# Patient Record
Sex: MALE | Race: WHITE | NOT HISPANIC OR LATINO | Employment: FULL TIME | URBAN - NONMETROPOLITAN AREA
[De-identification: names, ages, dates, MRNs, and addresses within clinical notes are randomized per-mention and may not be internally consistent; named-entity substitution may affect disease eponyms.]

---

## 2023-08-15 PROBLEM — N40.0 BPH (BENIGN PROSTATIC HYPERPLASIA): Status: ACTIVE | Noted: 2023-08-15

## 2023-08-15 PROBLEM — G45.9 TIA (TRANSIENT ISCHEMIC ATTACK): Status: ACTIVE | Noted: 2023-08-15

## 2023-08-15 PROBLEM — I10 BENIGN ESSENTIAL HTN: Status: ACTIVE | Noted: 2023-08-15

## 2023-08-15 PROBLEM — E78.00 HYPERCHOLESTEREMIA: Status: ACTIVE | Noted: 2023-08-15

## 2023-08-15 PROBLEM — K21.9 GERD (GASTROESOPHAGEAL REFLUX DISEASE): Status: ACTIVE | Noted: 2023-08-15

## 2023-08-15 PROBLEM — F41.9 ANXIETY: Status: ACTIVE | Noted: 2023-08-15

## 2023-08-15 RX ORDER — METOPROLOL TARTRATE 25 MG/1
1 TABLET, FILM COATED ORAL 2 TIMES DAILY
COMMUNITY
Start: 2016-07-09 | End: 2023-10-30

## 2023-08-15 RX ORDER — DOCUSATE SODIUM 100 MG/1
CAPSULE, LIQUID FILLED ORAL 2 TIMES DAILY
COMMUNITY
Start: 2021-05-27 | End: 2023-08-28 | Stop reason: ALTCHOICE

## 2023-08-28 ENCOUNTER — OFFICE VISIT (OUTPATIENT)
Dept: PRIMARY CARE | Facility: CLINIC | Age: 63
End: 2023-08-28
Payer: MEDICARE

## 2023-08-28 VITALS
WEIGHT: 153 LBS | HEART RATE: 86 BPM | DIASTOLIC BLOOD PRESSURE: 76 MMHG | BODY MASS INDEX: 22.59 KG/M2 | SYSTOLIC BLOOD PRESSURE: 124 MMHG | TEMPERATURE: 97.4 F | OXYGEN SATURATION: 98 %

## 2023-08-28 DIAGNOSIS — Z79.899 HIGH RISK MEDICATION USE: ICD-10-CM

## 2023-08-28 DIAGNOSIS — F32.A DEPRESSION, UNSPECIFIED DEPRESSION TYPE: Primary | ICD-10-CM

## 2023-08-28 DIAGNOSIS — K21.9 GASTROESOPHAGEAL REFLUX DISEASE WITHOUT ESOPHAGITIS: ICD-10-CM

## 2023-08-28 DIAGNOSIS — I10 BENIGN ESSENTIAL HTN: ICD-10-CM

## 2023-08-28 DIAGNOSIS — I10 HYPERTENSION, UNSPECIFIED TYPE: ICD-10-CM

## 2023-08-28 DIAGNOSIS — E78.00 HYPERCHOLESTEREMIA: ICD-10-CM

## 2023-08-28 DIAGNOSIS — E55.9 VITAMIN D DEFICIENCY, UNSPECIFIED: ICD-10-CM

## 2023-08-28 DIAGNOSIS — R53.83 OTHER FATIGUE: ICD-10-CM

## 2023-08-28 DIAGNOSIS — Z12.5 SCREENING FOR PROSTATE CANCER: ICD-10-CM

## 2023-08-28 DIAGNOSIS — E53.8 VITAMIN B12 DEFICIENCY: ICD-10-CM

## 2023-08-28 PROCEDURE — 99214 OFFICE O/P EST MOD 30 MIN: CPT | Performed by: INTERNAL MEDICINE

## 2023-08-28 PROCEDURE — 1159F MED LIST DOCD IN RCRD: CPT | Performed by: INTERNAL MEDICINE

## 2023-08-28 PROCEDURE — 1160F RVW MEDS BY RX/DR IN RCRD: CPT | Performed by: INTERNAL MEDICINE

## 2023-08-28 PROCEDURE — 3078F DIAST BP <80 MM HG: CPT | Performed by: INTERNAL MEDICINE

## 2023-08-28 PROCEDURE — 3074F SYST BP LT 130 MM HG: CPT | Performed by: INTERNAL MEDICINE

## 2023-08-28 RX ORDER — BUPROPION HYDROCHLORIDE 150 MG/1
150 TABLET ORAL EVERY MORNING
Qty: 30 TABLET | Refills: 1 | Status: SHIPPED | OUTPATIENT
Start: 2023-08-28 | End: 2023-09-20

## 2023-08-28 ASSESSMENT — ENCOUNTER SYMPTOMS
DEPRESSION: 1
FATIGUE: 1
DIFFICULTY URINATING: 0
ARTHRALGIAS: 0
SINUS PAIN: 0
COUGH: 0
ABDOMINAL PAIN: 0
SORE THROAT: 0
WHEEZING: 0
DIARRHEA: 0
DIZZINESS: 0
BRUISES/BLEEDS EASILY: 0
FEVER: 0
BLOOD IN STOOL: 0
HEADACHES: 0
PALPITATIONS: 0
HYPERTENSION: 1
UNEXPECTED WEIGHT CHANGE: 0

## 2023-08-28 NOTE — PROGRESS NOTES
Subjective   Patient ID: Bao Toth is a 63 y.o. male who presents for Anxiety, Benign Prostatic Hypertrophy, Hypertension, GERD, Hyperlipidemia, Transient Ischemic Attack, Fatigue, and Depression (Son was killed a few months ago).    -Fatigue and tiredness depression no energy symptoms worsened after his son was killed in May car accident  Patient counseled on sleep hygiene counseled about alcohol abstinence  Started Wellbutrin 150 daily titrate slowly as needed  -Nicotine dependency counseled about smoking cessation  Counseled about alcohol abstinence comes about sleep hygiene comes about smoking cessation  Suspected sleep apnea refer patient to sleep clinic  -Hypertension controlled continue with metoprolol no palpitations  -Vitamin D deficiency need to start vitamin D 5000 daily over-the-counter  -Benign prostatic hypertrophy symptoms are controlled  -Chronic reflux disease pantoprazole  Follow-up  3months  Needs to complete blood work before next appointment      Anxiety  Patient reports no chest pain, dizziness or palpitations.       Benign Prostatic Hypertrophy    Hypertension  Associated symptoms include anxiety. Pertinent negatives include no chest pain, headaches or palpitations.   GERD  He reports no abdominal pain, no chest pain, no coughing, no sore throat or no wheezing. Associated symptoms include fatigue.   Hyperlipidemia  Pertinent negatives include no chest pain.   Transient Ischemic Attack  Associated symptoms include fatigue. Pertinent negatives include no abdominal pain, arthralgias, chest pain, congestion, coughing, fever, headaches, rash or sore throat.   Fatigue  Associated symptoms include fatigue. Pertinent negatives include no abdominal pain, arthralgias, chest pain, congestion, coughing, fever, headaches, rash or sore throat.   DepressionPatient is not experiencing: palpitations.             Review of Systems   Constitutional:  Positive for fatigue. Negative for fever and  "unexpected weight change.   HENT:  Negative for congestion, ear discharge, ear pain, mouth sores, sinus pain and sore throat.    Eyes:  Negative for visual disturbance.   Respiratory:  Negative for cough and wheezing.    Cardiovascular:  Negative for chest pain, palpitations and leg swelling.   Gastrointestinal:  Negative for abdominal pain, blood in stool and diarrhea.   Genitourinary:  Negative for difficulty urinating.   Musculoskeletal:  Negative for arthralgias.   Skin:  Negative for rash.   Neurological:  Negative for dizziness and headaches.   Hematological:  Does not bruise/bleed easily.   Psychiatric/Behavioral:  Positive for depression. Negative for behavioral problems.    All other systems reviewed and are negative.      Objective   No results found for: \"HGBA1C\"   /76   Pulse 86   Temp 36.3 °C (97.4 °F)   Wt 69.4 kg (153 lb)   SpO2 98%   BMI 22.59 kg/m²     Physical Exam  Vitals and nursing note reviewed.   Constitutional:       Appearance: Normal appearance.   HENT:      Head: Normocephalic.      Nose: Nose normal.   Eyes:      Conjunctiva/sclera: Conjunctivae normal.      Pupils: Pupils are equal, round, and reactive to light.   Cardiovascular:      Rate and Rhythm: Regular rhythm.   Pulmonary:      Effort: Pulmonary effort is normal.      Breath sounds: Normal breath sounds.   Abdominal:      General: Abdomen is flat.      Palpations: Abdomen is soft.   Musculoskeletal:      Cervical back: Neck supple.   Skin:     General: Skin is warm.   Neurological:      General: No focal deficit present.      Mental Status: He is oriented to person, place, and time.      Cranial Nerves: No cranial nerve deficit.      Sensory: No sensory deficit.      Motor: No weakness.      Coordination: Coordination normal.      Gait: Gait normal.      Deep Tendon Reflexes: Reflexes normal.   Psychiatric:         Mood and Affect: Mood normal.         Behavior: Behavior normal.         Thought Content: Thought content " normal.         Judgment: Judgment normal.         Assessment/Plan   Bao was seen today for anxiety, benign prostatic hypertrophy, hypertension, gerd, hyperlipidemia, transient ischemic attack, fatigue and depression.  Diagnoses and all orders for this visit:  Depression, unspecified depression type (Primary)  -     buPROPion XL (Wellbutrin XL) 150 mg 24 hr tablet; Take 1 tablet (150 mg) by mouth once daily in the morning. Do not crush, chew, or split.  Benign essential HTN  Hypercholesteremia  -     Lipid Panel; Future  Gastroesophageal reflux disease without esophagitis  High risk medication use  -     CBC and Auto Differential; Future  Hypertension, unspecified type  -     Comprehensive Metabolic Panel; Future  Other fatigue  -     TSH with reflex to Free T4 if abnormal; Future  Screening for prostate cancer  -     Prostate Specific Antigen, Screen; Future  Vitamin B12 deficiency  -     Vitamin B12; Future  Vitamin D deficiency, unspecified  -     Vitamin D, Total; Future  Other orders  -     Follow Up In Primary Care - Established; Future   -Fatigue and tiredness depression no energy symptoms worsened after his son was killed in May car accident  Patient counseled on sleep hygiene counseled about alcohol abstinence  Started Wellbutrin 150 daily titrate slowly as needed  -Nicotine dependency counseled about smoking cessation  Counseled about alcohol abstinence comes about sleep hygiene comes about smoking cessation  Suspected sleep apnea refer patient to sleep clinic  -Hypertension controlled continue with metoprolol no palpitations  -Vitamin D deficiency need to start vitamin D 5000 daily over-the-counter  -Benign prostatic hypertrophy symptoms are controlled  -Chronic reflux disease pantoprazole  Follow-up  3months  Needs to complete blood work before next appointment

## 2023-08-31 ENCOUNTER — LAB (OUTPATIENT)
Dept: LAB | Facility: LAB | Age: 63
End: 2023-08-31
Payer: MEDICARE

## 2023-08-31 DIAGNOSIS — E78.00 HYPERCHOLESTEREMIA: ICD-10-CM

## 2023-08-31 DIAGNOSIS — I10 HYPERTENSION, UNSPECIFIED TYPE: ICD-10-CM

## 2023-08-31 DIAGNOSIS — E55.9 VITAMIN D DEFICIENCY, UNSPECIFIED: ICD-10-CM

## 2023-08-31 DIAGNOSIS — E53.8 VITAMIN B12 DEFICIENCY: ICD-10-CM

## 2023-08-31 DIAGNOSIS — R53.83 OTHER FATIGUE: ICD-10-CM

## 2023-08-31 DIAGNOSIS — Z12.5 SCREENING FOR PROSTATE CANCER: ICD-10-CM

## 2023-08-31 DIAGNOSIS — Z79.899 HIGH RISK MEDICATION USE: ICD-10-CM

## 2023-08-31 LAB
ALANINE AMINOTRANSFERASE (SGPT) (U/L) IN SER/PLAS: 17 U/L (ref 10–52)
ALBUMIN (G/DL) IN SER/PLAS: 4.3 G/DL (ref 3.4–5)
ALKALINE PHOSPHATASE (U/L) IN SER/PLAS: 47 U/L (ref 33–136)
ANION GAP IN SER/PLAS: 11 MMOL/L (ref 10–20)
ASPARTATE AMINOTRANSFERASE (SGOT) (U/L) IN SER/PLAS: 19 U/L (ref 9–39)
BASOPHILS (10*3/UL) IN BLOOD BY AUTOMATED COUNT: 0.09 X10E9/L (ref 0–0.1)
BASOPHILS/100 LEUKOCYTES IN BLOOD BY AUTOMATED COUNT: 1 % (ref 0–2)
BILIRUBIN TOTAL (MG/DL) IN SER/PLAS: 0.5 MG/DL (ref 0–1.2)
CALCIDIOL (25 OH VITAMIN D3) (NG/ML) IN SER/PLAS: 70 NG/ML
CALCIUM (MG/DL) IN SER/PLAS: 9.2 MG/DL (ref 8.6–10.3)
CARBON DIOXIDE, TOTAL (MMOL/L) IN SER/PLAS: 28 MMOL/L (ref 21–32)
CHLORIDE (MMOL/L) IN SER/PLAS: 103 MMOL/L (ref 98–107)
CHOLESTEROL (MG/DL) IN SER/PLAS: 177 MG/DL (ref 0–199)
CHOLESTEROL IN HDL (MG/DL) IN SER/PLAS: 63.6 MG/DL
CHOLESTEROL/HDL RATIO: 2.8
COBALAMIN (VITAMIN B12) (PG/ML) IN SER/PLAS: 601 PG/ML (ref 211–911)
CREATININE (MG/DL) IN SER/PLAS: 0.58 MG/DL (ref 0.5–1.3)
EOSINOPHILS (10*3/UL) IN BLOOD BY AUTOMATED COUNT: 0.39 X10E9/L (ref 0–0.7)
EOSINOPHILS/100 LEUKOCYTES IN BLOOD BY AUTOMATED COUNT: 4.5 % (ref 0–6)
ERYTHROCYTE DISTRIBUTION WIDTH (RATIO) BY AUTOMATED COUNT: 12.4 % (ref 11.5–14.5)
ERYTHROCYTE MEAN CORPUSCULAR HEMOGLOBIN CONCENTRATION (G/DL) BY AUTOMATED: 34.3 G/DL (ref 32–36)
ERYTHROCYTE MEAN CORPUSCULAR VOLUME (FL) BY AUTOMATED COUNT: 95 FL (ref 80–100)
ERYTHROCYTES (10*6/UL) IN BLOOD BY AUTOMATED COUNT: 4.85 X10E12/L (ref 4.5–5.9)
GFR MALE: >90 ML/MIN/1.73M2
GLUCOSE (MG/DL) IN SER/PLAS: 87 MG/DL (ref 74–99)
HEMATOCRIT (%) IN BLOOD BY AUTOMATED COUNT: 46 % (ref 41–52)
HEMOGLOBIN (G/DL) IN BLOOD: 15.8 G/DL (ref 13.5–17.5)
IMMATURE GRANULOCYTES/100 LEUKOCYTES IN BLOOD BY AUTOMATED COUNT: 0.3 % (ref 0–0.9)
LDL: 97 MG/DL (ref 0–99)
LEUKOCYTES (10*3/UL) IN BLOOD BY AUTOMATED COUNT: 8.6 X10E9/L (ref 4.4–11.3)
LYMPHOCYTES (10*3/UL) IN BLOOD BY AUTOMATED COUNT: 2.01 X10E9/L (ref 1.2–4.8)
LYMPHOCYTES/100 LEUKOCYTES IN BLOOD BY AUTOMATED COUNT: 23.4 % (ref 13–44)
MONOCYTES (10*3/UL) IN BLOOD BY AUTOMATED COUNT: 0.64 X10E9/L (ref 0.1–1)
MONOCYTES/100 LEUKOCYTES IN BLOOD BY AUTOMATED COUNT: 7.4 % (ref 2–10)
NEUTROPHILS (10*3/UL) IN BLOOD BY AUTOMATED COUNT: 5.44 X10E9/L (ref 1.2–7.7)
NEUTROPHILS/100 LEUKOCYTES IN BLOOD BY AUTOMATED COUNT: 63.4 % (ref 40–80)
PLATELETS (10*3/UL) IN BLOOD AUTOMATED COUNT: 195 X10E9/L (ref 150–450)
POTASSIUM (MMOL/L) IN SER/PLAS: 4.6 MMOL/L (ref 3.5–5.3)
PROSTATE SPECIFIC ANTIGEN,SCREEN: 4.5 NG/ML (ref 0–4)
PROTEIN TOTAL: 6.4 G/DL (ref 6.4–8.2)
SODIUM (MMOL/L) IN SER/PLAS: 137 MMOL/L (ref 136–145)
THYROTROPIN (MIU/L) IN SER/PLAS BY DETECTION LIMIT <= 0.05 MIU/L: 1.37 MIU/L (ref 0.44–3.98)
TRIGLYCERIDE (MG/DL) IN SER/PLAS: 83 MG/DL (ref 0–149)
UREA NITROGEN (MG/DL) IN SER/PLAS: 8 MG/DL (ref 6–23)
VLDL: 17 MG/DL (ref 0–40)

## 2023-08-31 PROCEDURE — 82607 VITAMIN B-12: CPT

## 2023-08-31 PROCEDURE — 85025 COMPLETE CBC W/AUTO DIFF WBC: CPT

## 2023-08-31 PROCEDURE — 82306 VITAMIN D 25 HYDROXY: CPT

## 2023-08-31 PROCEDURE — 80053 COMPREHEN METABOLIC PANEL: CPT

## 2023-08-31 PROCEDURE — 84443 ASSAY THYROID STIM HORMONE: CPT

## 2023-08-31 PROCEDURE — G0103 PSA SCREENING: HCPCS

## 2023-08-31 PROCEDURE — 80061 LIPID PANEL: CPT

## 2023-08-31 PROCEDURE — 36415 COLL VENOUS BLD VENIPUNCTURE: CPT

## 2023-09-20 DIAGNOSIS — F32.A DEPRESSION, UNSPECIFIED DEPRESSION TYPE: ICD-10-CM

## 2023-09-20 RX ORDER — BUPROPION HYDROCHLORIDE 150 MG/1
150 TABLET ORAL EVERY MORNING
Qty: 30 TABLET | Refills: 1 | Status: SHIPPED | OUTPATIENT
Start: 2023-09-20 | End: 2023-10-10

## 2023-10-05 DIAGNOSIS — F32.A DEPRESSION, UNSPECIFIED DEPRESSION TYPE: ICD-10-CM

## 2023-10-10 RX ORDER — BUPROPION HYDROCHLORIDE 150 MG/1
150 TABLET ORAL EVERY MORNING
Qty: 90 TABLET | Refills: 0 | Status: SHIPPED | OUTPATIENT
Start: 2023-10-10 | End: 2023-12-07 | Stop reason: ALTCHOICE

## 2023-10-30 DIAGNOSIS — I10 BENIGN ESSENTIAL HTN: ICD-10-CM

## 2023-10-30 RX ORDER — METOPROLOL TARTRATE 25 MG/1
25 TABLET, FILM COATED ORAL 2 TIMES DAILY
Qty: 180 TABLET | Refills: 0 | Status: SHIPPED | OUTPATIENT
Start: 2023-10-30 | End: 2024-01-31

## 2023-11-28 ENCOUNTER — APPOINTMENT (OUTPATIENT)
Dept: PRIMARY CARE | Facility: CLINIC | Age: 63
End: 2023-11-28
Payer: MEDICARE

## 2023-12-07 ENCOUNTER — OFFICE VISIT (OUTPATIENT)
Dept: PRIMARY CARE | Facility: CLINIC | Age: 63
End: 2023-12-07
Payer: MEDICARE

## 2023-12-07 VITALS
DIASTOLIC BLOOD PRESSURE: 70 MMHG | SYSTOLIC BLOOD PRESSURE: 120 MMHG | HEART RATE: 81 BPM | BODY MASS INDEX: 23.05 KG/M2 | OXYGEN SATURATION: 97 % | HEIGHT: 69 IN | TEMPERATURE: 96.9 F | WEIGHT: 155.6 LBS

## 2023-12-07 DIAGNOSIS — Z00.00 ROUTINE GENERAL MEDICAL EXAMINATION AT HEALTH CARE FACILITY: ICD-10-CM

## 2023-12-07 DIAGNOSIS — R97.20 HIGH PROSTATE SPECIFIC ANTIGEN (PSA): Primary | ICD-10-CM

## 2023-12-07 DIAGNOSIS — I10 BENIGN ESSENTIAL HTN: ICD-10-CM

## 2023-12-07 DIAGNOSIS — Z87.19 H/O INGUINAL HERNIA: ICD-10-CM

## 2023-12-07 DIAGNOSIS — Z11.59 NEED FOR HEPATITIS C SCREENING TEST: ICD-10-CM

## 2023-12-07 DIAGNOSIS — Z13.6 SCREENING FOR CARDIOVASCULAR CONDITION: ICD-10-CM

## 2023-12-07 PROCEDURE — 3078F DIAST BP <80 MM HG: CPT | Performed by: INTERNAL MEDICINE

## 2023-12-07 PROCEDURE — 1160F RVW MEDS BY RX/DR IN RCRD: CPT | Performed by: INTERNAL MEDICINE

## 2023-12-07 PROCEDURE — 3074F SYST BP LT 130 MM HG: CPT | Performed by: INTERNAL MEDICINE

## 2023-12-07 PROCEDURE — 1170F FXNL STATUS ASSESSED: CPT | Performed by: INTERNAL MEDICINE

## 2023-12-07 PROCEDURE — 1159F MED LIST DOCD IN RCRD: CPT | Performed by: INTERNAL MEDICINE

## 2023-12-07 PROCEDURE — G0402 INITIAL PREVENTIVE EXAM: HCPCS | Performed by: INTERNAL MEDICINE

## 2023-12-07 PROCEDURE — G0403 EKG FOR INITIAL PREVENT EXAM: HCPCS | Performed by: INTERNAL MEDICINE

## 2023-12-07 ASSESSMENT — ENCOUNTER SYMPTOMS
PALPITATIONS: 0
COUGH: 0
HYPERTENSION: 1
ARTHRALGIAS: 0
BRUISES/BLEEDS EASILY: 0
DIARRHEA: 0
FATIGUE: 0
BLOOD IN STOOL: 0
LOSS OF SENSATION IN FEET: 0
SORE THROAT: 0
OCCASIONAL FEELINGS OF UNSTEADINESS: 0
ABDOMINAL PAIN: 0
WHEEZING: 0
FEVER: 0
UNEXPECTED WEIGHT CHANGE: 0
DEPRESSION: 1
SINUS PAIN: 0
DIZZINESS: 0
DIFFICULTY URINATING: 0
HEADACHES: 0

## 2023-12-07 ASSESSMENT — ACTIVITIES OF DAILY LIVING (ADL)
MANAGING_FINANCES: INDEPENDENT
DRESSING: INDEPENDENT
DOING_HOUSEWORK: INDEPENDENT
GROCERY_SHOPPING: INDEPENDENT
TAKING_MEDICATION: INDEPENDENT
BATHING: INDEPENDENT

## 2023-12-07 ASSESSMENT — VISUAL ACUITY
OD_CC: 20/40
OS_CC: 20/20

## 2023-12-07 ASSESSMENT — PATIENT HEALTH QUESTIONNAIRE - PHQ9
1. LITTLE INTEREST OR PLEASURE IN DOING THINGS: SEVERAL DAYS
2. FEELING DOWN, DEPRESSED OR HOPELESS: SEVERAL DAYS
SUM OF ALL RESPONSES TO PHQ9 QUESTIONS 1 AND 2: 2

## 2023-12-07 NOTE — PROGRESS NOTES
"Subjective   Patient ID: Bao Toth is a 63 y.o. male who presents for Welcome To Medicare, GERD, Hypertension, Depression, Results, and no energy.    -Fatigue and tiredness depression no energy symptoms worsened after his son was killed in May car accident  Patient counseled on sleep hygiene counseled about alcohol abstinence  Started Wellbutrin 150 daily titrate slowly as needed  -Nicotine dependency counseled about smoking cessation  Counseled about alcohol abstinence comes about sleep hygiene comes about smoking cessation  Suspected sleep apnea refer patient to sleep clinic  -Hypertension controlled continue with metoprolol no palpitations  -Vitamin D deficiency need to start vitamin D 5000 daily over-the-counter  -Benign prostatic hypertrophy symptoms are controlled  -Chronic reflux disease pantoprazole  Follow-up  3months  Needs to complete blood work before next appointment         GERD    Hypertension    Depression         Review of Systems   Psychiatric/Behavioral:  Positive for depression.        Objective   No results found for: \"HGBA1C\"   /70   Pulse 81   Temp 36.1 °C (96.9 °F)   Ht 1.753 m (5' 9\")   Wt 70.6 kg (155 lb 9.6 oz)   SpO2 97%   BMI 22.98 kg/m²   Lab Results   Component Value Date    WBC 8.6 08/31/2023    HGB 15.8 08/31/2023    HCT 46.0 08/31/2023     08/31/2023    CHOL 177 08/31/2023    TRIG 83 08/31/2023    HDL 63.6 08/31/2023    ALT 17 08/31/2023    AST 19 08/31/2023     08/31/2023    K 4.6 08/31/2023     08/31/2023    CREATININE 0.58 08/31/2023    BUN 8 08/31/2023    CO2 28 08/31/2023    TSH 1.37 08/31/2023    INR 1.1 08/13/2018     par   Physical Exam    Assessment/Plan   Bao was seen today for welcome to medicare, gerd, hypertension, depression, results and no energy.  Diagnoses and all orders for this visit:  Depression, unspecified depression type  Benign essential HTN  Epigastric pain     "

## 2023-12-07 NOTE — PROGRESS NOTES
Subjective   Reason for Visit: Bao Toth is an 63 y.o. male here for a Medicare Wellness visit.     Past Medical, Surgical, and Family History reviewed and updated in chart.    Reviewed all medications by prescribing practitioner or clinical pharmacist (such as prescriptions, OTCs, herbal therapies and supplements) and documented in the medical record.    Welcome to Medicare  - Vaccinations reviewed patient need to proceed with Shingrix vaccine  - Screening colonoscopy obtained in 2016 need to repeat in 10 years 2026  -Recent blood work reviewed and up-to-date  - Screening for depression negative patient recovering now since his son passed away in a car accident doing otherwise well did not tolerate any antidepressant patient not taking anything at this time  -Nicotine dependency counseled about smoking cessation  -Counseled about alcohol abstinence comes about sleep hygiene comes about smoking cessation  - Needs screening for hepatitis C  - EKG sinus rhythm no ST T wave changes patient reassured    Follow-up  - Underlying groin hernia seen by general surgery patient need to quit smoking first patient wants to delay any surgical intervention will follow patient conservatively follow-up to the emergency room if any signs of obstruction  -Elevated blood PSA, 4.5 asymptomatic may have underlying mild BPH repeat PSA again in 6 months  - Suspected sleep apnea refer patient to sleep clinic patient declined  -Hypertension controlled continue with metoprolol no palpitations  -Vitamin D deficiency continue on vitamin D 5000 daily over-the-counter  -Benign prostatic hypertrophy symptoms are controlled  -Chronic reflux disease pantoprazole  Follow-up 6 months    GERD  He reports no abdominal pain, no chest pain, no coughing, no sore throat or no wheezing. Pertinent negatives include no fatigue.   Hypertension  Pertinent negatives include no chest pain, headaches or palpitations.   DepressionPatient is not experiencing:  "palpitations.          Patient Care Team:  Artemio Rajput MD as PCP - General  Artemio Rajput MD as PCP - [employer] PCP     Review of Systems   Constitutional:  Negative for fatigue, fever and unexpected weight change.   HENT:  Negative for congestion, ear discharge, ear pain, mouth sores, sinus pain and sore throat.    Eyes:  Negative for visual disturbance.   Respiratory:  Negative for cough and wheezing.    Cardiovascular:  Negative for chest pain, palpitations and leg swelling.   Gastrointestinal:  Negative for abdominal pain, blood in stool and diarrhea.   Genitourinary:  Negative for difficulty urinating.   Musculoskeletal:  Negative for arthralgias.   Skin:  Negative for rash.   Neurological:  Negative for dizziness and headaches.   Hematological:  Does not bruise/bleed easily.   Psychiatric/Behavioral:  Positive for depression. Negative for behavioral problems.    All other systems reviewed and are negative.      Objective   Vitals:  /70   Pulse 81   Temp 36.1 °C (96.9 °F)   Ht 1.753 m (5' 9\")   Wt 70.6 kg (155 lb 9.6 oz)   SpO2 97%   BMI 22.98 kg/m²     Lab Results   Component Value Date    WBC 8.6 08/31/2023    HGB 15.8 08/31/2023    HCT 46.0 08/31/2023     08/31/2023    CHOL 177 08/31/2023    TRIG 83 08/31/2023    HDL 63.6 08/31/2023    ALT 17 08/31/2023    AST 19 08/31/2023     08/31/2023    K 4.6 08/31/2023     08/31/2023    CREATININE 0.58 08/31/2023    BUN 8 08/31/2023    CO2 28 08/31/2023    TSH 1.37 08/31/2023    INR 1.1 08/13/2018     par   Physical Exam  Vitals and nursing note reviewed.   Constitutional:       Appearance: Normal appearance.   HENT:      Head: Normocephalic.      Nose: Nose normal.   Eyes:      Conjunctiva/sclera: Conjunctivae normal.      Pupils: Pupils are equal, round, and reactive to light.   Cardiovascular:      Rate and Rhythm: Regular rhythm.   Pulmonary:      Effort: Pulmonary effort is normal.      Breath sounds: Normal " breath sounds.   Abdominal:      General: Abdomen is flat.      Palpations: Abdomen is soft.      Hernia: A hernia is present.   Musculoskeletal:      Cervical back: Neck supple.   Skin:     General: Skin is warm.   Neurological:      General: No focal deficit present.      Mental Status: He is oriented to person, place, and time.   Psychiatric:         Mood and Affect: Mood normal.         Assessment/Plan   Problem List Items Addressed This Visit       Benign essential HTN     Other Visit Diagnoses       High prostate specific antigen (PSA)    -  Primary    Relevant Orders    Prostate Specific Antigen, Screen    Screening for cardiovascular condition        Relevant Orders    ECG 12 lead    Need for hepatitis C screening test        Relevant Orders    Hepatitis C antibody    Routine general medical examination at health care facility        Relevant Medications    zoster vaccine-recombinant adjuvanted (Shingrix) 50 mcg/0.5 mL vaccine    H/O inguinal hernia              Welcome to Medicare  - Vaccinations reviewed patient need to proceed with Shingrix vaccine  - Screening colonoscopy obtained in 2016 need to repeat in 10 years 2026  -Recent blood work reviewed and up-to-date  - Screening for depression negative patient recovering now since his son passed away in a car accident doing otherwise well did not tolerate any antidepressant patient not taking anything at this time  -Nicotine dependency counseled about smoking cessation  -Counseled about alcohol abstinence comes about sleep hygiene comes about smoking cessation  - Needs screening for hepatitis C  - EKG sinus rhythm no ST T wave changes patient reassured    Follow-up  - Underlying groin hernia seen by general surgery patient need to quit smoking first patient wants to delay any surgical intervention will follow patient conservatively follow-up to the emergency room if any signs of obstruction  -Elevated blood PSA, 4.5 asymptomatic may have underlying mild BPH  repeat PSA again in 6 months  - Suspected sleep apnea refer patient to sleep clinic patient declined  -Hypertension controlled continue with metoprolol no palpitations  -Vitamin D deficiency continue on vitamin D 5000 daily over-the-counter  -Benign prostatic hypertrophy symptoms are controlled  -Chronic reflux disease pantoprazole  Follow-up 6 months

## 2024-01-31 DIAGNOSIS — I10 BENIGN ESSENTIAL HTN: ICD-10-CM

## 2024-01-31 RX ORDER — METOPROLOL TARTRATE 25 MG/1
25 TABLET, FILM COATED ORAL 2 TIMES DAILY
Qty: 180 TABLET | Refills: 0 | Status: SHIPPED | OUTPATIENT
Start: 2024-01-31 | End: 2024-05-06

## 2024-02-04 DIAGNOSIS — R10.13 EPIGASTRIC PAIN: ICD-10-CM

## 2024-02-06 RX ORDER — OMEPRAZOLE 40 MG/1
CAPSULE, DELAYED RELEASE ORAL
Qty: 90 CAPSULE | Refills: 1 | Status: SHIPPED | OUTPATIENT
Start: 2024-02-06

## 2024-05-04 DIAGNOSIS — I10 BENIGN ESSENTIAL HTN: ICD-10-CM

## 2024-05-06 RX ORDER — METOPROLOL TARTRATE 25 MG/1
25 TABLET, FILM COATED ORAL 2 TIMES DAILY
Qty: 180 TABLET | Refills: 0 | Status: SHIPPED | OUTPATIENT
Start: 2024-05-06

## 2024-06-13 ENCOUNTER — APPOINTMENT (OUTPATIENT)
Dept: PRIMARY CARE | Facility: CLINIC | Age: 64
End: 2024-06-13
Payer: MEDICARE

## 2024-07-17 ENCOUNTER — APPOINTMENT (OUTPATIENT)
Dept: PRIMARY CARE | Facility: CLINIC | Age: 64
End: 2024-07-17
Payer: MEDICARE

## 2024-07-17 VITALS
SYSTOLIC BLOOD PRESSURE: 130 MMHG | WEIGHT: 159 LBS | BODY MASS INDEX: 23.48 KG/M2 | OXYGEN SATURATION: 96 % | DIASTOLIC BLOOD PRESSURE: 62 MMHG | HEART RATE: 86 BPM | TEMPERATURE: 97.9 F

## 2024-07-17 DIAGNOSIS — R97.20 ELEVATED PSA: ICD-10-CM

## 2024-07-17 DIAGNOSIS — R10.13 EPIGASTRIC PAIN: ICD-10-CM

## 2024-07-17 DIAGNOSIS — F32.A DEPRESSION, UNSPECIFIED DEPRESSION TYPE: ICD-10-CM

## 2024-07-17 DIAGNOSIS — I10 BENIGN ESSENTIAL HTN: ICD-10-CM

## 2024-07-17 DIAGNOSIS — K62.89 ANAL PAIN: Primary | ICD-10-CM

## 2024-07-17 PROCEDURE — 3075F SYST BP GE 130 - 139MM HG: CPT | Performed by: INTERNAL MEDICINE

## 2024-07-17 PROCEDURE — 1160F RVW MEDS BY RX/DR IN RCRD: CPT | Performed by: INTERNAL MEDICINE

## 2024-07-17 PROCEDURE — 3078F DIAST BP <80 MM HG: CPT | Performed by: INTERNAL MEDICINE

## 2024-07-17 PROCEDURE — 1159F MED LIST DOCD IN RCRD: CPT | Performed by: INTERNAL MEDICINE

## 2024-07-17 PROCEDURE — 99214 OFFICE O/P EST MOD 30 MIN: CPT | Performed by: INTERNAL MEDICINE

## 2024-07-17 RX ORDER — OMEPRAZOLE 40 MG/1
40 CAPSULE, DELAYED RELEASE ORAL
Qty: 90 CAPSULE | Refills: 1 | Status: CANCELLED | OUTPATIENT
Start: 2024-07-17

## 2024-07-17 RX ORDER — BUPROPION HYDROCHLORIDE 150 MG/1
150 TABLET ORAL EVERY MORNING
Qty: 90 TABLET | Refills: 3 | Status: SHIPPED | OUTPATIENT
Start: 2024-07-17 | End: 2025-07-17

## 2024-07-17 RX ORDER — METOPROLOL TARTRATE 25 MG/1
25 TABLET, FILM COATED ORAL 2 TIMES DAILY
Qty: 180 TABLET | Refills: 0 | Status: CANCELLED | OUTPATIENT
Start: 2024-07-17

## 2024-07-17 ASSESSMENT — ENCOUNTER SYMPTOMS
ABDOMINAL PAIN: 1
HYPERTENSION: 1

## 2024-07-17 NOTE — PROGRESS NOTES
Subjective   Patient ID: Bao Toth is a 64 y.o. male who presents for Hypertension, Abdominal Pain (Epigastric pain, pain also in perirectal area), no energy, and Diarrhea (Loose bowels, no appetite, muscle weakness especially in legs).    - Recent blood work reviewed  - Chronic fatigue syndrome  Possible underlying sleep apnea patient counseled about sleep hygiene  - Patient drinks beer daily counseled about alcohol abstinence  -Mild depression and fatigue and tiredness we will try patient on Wellbutrin 150 mg in a.m. titrate patient slowly reevaluate patient in 4 weeks  -Elevated PSA refer patient to urology for further eval recommendation due to underlying recurrent anal rectal pain possible prostatitis follow-up further consultation  - Underlying groin hernia seen by general surgery patient need to quit smoking first patient wants to delay any surgical intervention will follow patient conservatively follow-up to the emergency room if any signs of obstruction  -Elevated blood PSA, 4.5 asymptomatic may have underlying mild BPH repeat PSA again in 6 months  - Suspected sleep apnea refer patient to sleep clinic patient declined  -Hypertension controlled continue with metoprolol no palpitations  -Vitamin D deficiency continue on vitamin D 5000 daily over-the-counter  --Chronic reflux disease pantoprazole  Follow-up 4 weeks      Hypertension  Pertinent negatives include no chest pain, headaches or palpitations.   Abdominal Pain  Pertinent negatives include no arthralgias, diarrhea, fever or headaches.   Diarrhea   Associated symptoms include abdominal pain. Pertinent negatives include no arthralgias, coughing, fever or headaches.          Review of Systems   Constitutional:  Positive for fatigue. Negative for fever and unexpected weight change.   HENT:  Negative for congestion, ear discharge, ear pain, mouth sores, sinus pain and sore throat.    Eyes:  Negative for visual disturbance.   Respiratory:  Negative  "for cough and wheezing.    Cardiovascular:  Negative for chest pain, palpitations and leg swelling.   Gastrointestinal:  Positive for abdominal pain. Negative for blood in stool and diarrhea.   Genitourinary:  Negative for difficulty urinating.   Musculoskeletal:  Negative for arthralgias.   Skin:  Negative for rash.   Neurological:  Negative for dizziness and headaches.   Hematological:  Does not bruise/bleed easily.   Psychiatric/Behavioral:  Negative for behavioral problems.    All other systems reviewed and are negative.      Objective   No results found for: \"HGBA1C\"   /62   Pulse 86   Temp 36.6 °C (97.9 °F)   Wt 72.1 kg (159 lb)   SpO2 96%   BMI 23.48 kg/m²   Lab Results   Component Value Date    WBC 8.6 08/31/2023    HGB 15.8 08/31/2023    HCT 46.0 08/31/2023     08/31/2023    CHOL 177 08/31/2023    TRIG 83 08/31/2023    HDL 63.6 08/31/2023    ALT 17 08/31/2023    AST 19 08/31/2023     08/31/2023    K 4.6 08/31/2023     08/31/2023    CREATININE 0.58 08/31/2023    BUN 8 08/31/2023    CO2 28 08/31/2023    TSH 1.37 08/31/2023    INR 1.1 08/13/2018     par   Physical Exam  Vitals and nursing note reviewed.   Constitutional:       Appearance: Normal appearance.   HENT:      Head: Normocephalic.      Nose: Nose normal.   Eyes:      Conjunctiva/sclera: Conjunctivae normal.      Pupils: Pupils are equal, round, and reactive to light.   Cardiovascular:      Rate and Rhythm: Regular rhythm.   Pulmonary:      Effort: Pulmonary effort is normal.      Breath sounds: Normal breath sounds.   Abdominal:      General: Abdomen is flat.      Palpations: Abdomen is soft.   Musculoskeletal:      Cervical back: Neck supple.   Skin:     General: Skin is warm.   Neurological:      General: No focal deficit present.      Mental Status: He is oriented to person, place, and time.   Psychiatric:         Mood and Affect: Mood normal.         Assessment/Plan   Bao was seen today for hypertension, abdominal " pain, no energy and diarrhea.  Diagnoses and all orders for this visit:  Anal pain (Primary)  -     Referral to Urology; Future  Elevated PSA  -     Referral to Urology; Future  Depression, unspecified depression type  -     buPROPion XL (Wellbutrin XL) 150 mg 24 hr tablet; Take 1 tablet (150 mg) by mouth once daily in the morning. Do not crush, chew, or split.  Benign essential HTN  Epigastric pain  Other orders  -     Follow Up In Primary Care - Medicare Annual; Future   - Recent blood work reviewed  - Chronic fatigue syndrome  Possible underlying sleep apnea patient counseled about sleep hygiene  - Patient drinks beer daily counseled about alcohol abstinence  -Mild depression and fatigue and tiredness we will try patient on Wellbutrin 150 mg in a.m. titrate patient slowly reevaluate patient in 4 weeks  -Elevated PSA refer patient to urology for further eval recommendation due to underlying recurrent anal rectal pain possible prostatitis follow-up further consultation  - Underlying groin hernia seen by general surgery patient need to quit smoking first patient wants to delay any surgical intervention will follow patient conservatively follow-up to the emergency room if any signs of obstruction  -Elevated blood PSA, 4.5 asymptomatic may have underlying mild BPH repeat PSA again in 6 months  - Suspected sleep apnea refer patient to sleep clinic patient declined  -Hypertension controlled continue with metoprolol no palpitations  -Vitamin D deficiency continue on vitamin D 5000 daily over-the-counter  --Chronic reflux disease pantoprazole  Follow-up 4 weeks

## 2024-07-18 PROBLEM — R97.20 ELEVATED PSA: Status: ACTIVE | Noted: 2024-07-18

## 2024-07-18 PROBLEM — K62.89 ANAL PAIN: Status: ACTIVE | Noted: 2024-07-18

## 2024-07-18 ASSESSMENT — ENCOUNTER SYMPTOMS
DIFFICULTY URINATING: 0
SORE THROAT: 0
FEVER: 0
BRUISES/BLEEDS EASILY: 0
ARTHRALGIAS: 0
UNEXPECTED WEIGHT CHANGE: 0
DIZZINESS: 0
PALPITATIONS: 0
BLOOD IN STOOL: 0
SINUS PAIN: 0
DIARRHEA: 0
FATIGUE: 1
HEADACHES: 0
WHEEZING: 0
COUGH: 0

## 2024-08-04 DIAGNOSIS — I10 BENIGN ESSENTIAL HTN: ICD-10-CM

## 2024-08-04 DIAGNOSIS — R10.13 EPIGASTRIC PAIN: ICD-10-CM

## 2024-08-05 RX ORDER — METOPROLOL TARTRATE 25 MG/1
25 TABLET, FILM COATED ORAL DAILY
Qty: 90 TABLET | Refills: 1 | Status: SHIPPED | OUTPATIENT
Start: 2024-08-05

## 2024-08-05 RX ORDER — OMEPRAZOLE 40 MG/1
CAPSULE, DELAYED RELEASE ORAL
Qty: 90 CAPSULE | Refills: 1 | Status: SHIPPED | OUTPATIENT
Start: 2024-08-05

## 2024-09-09 DIAGNOSIS — I10 BENIGN ESSENTIAL HTN: ICD-10-CM

## 2024-09-09 RX ORDER — METOPROLOL TARTRATE 25 MG/1
25 TABLET, FILM COATED ORAL DAILY
Qty: 90 TABLET | Refills: 1 | Status: SHIPPED | OUTPATIENT
Start: 2024-09-09

## 2024-10-16 ENCOUNTER — APPOINTMENT (OUTPATIENT)
Dept: PRIMARY CARE | Facility: CLINIC | Age: 64
End: 2024-10-16
Payer: MEDICARE

## 2024-10-16 VITALS
DIASTOLIC BLOOD PRESSURE: 80 MMHG | BODY MASS INDEX: 23.55 KG/M2 | OXYGEN SATURATION: 97 % | SYSTOLIC BLOOD PRESSURE: 120 MMHG | TEMPERATURE: 97 F | HEART RATE: 85 BPM | HEIGHT: 69 IN | WEIGHT: 159 LBS

## 2024-10-16 DIAGNOSIS — R97.20 ELEVATED PSA: ICD-10-CM

## 2024-10-16 DIAGNOSIS — F17.219 CIGARETTE NICOTINE DEPENDENCE WITH NICOTINE-INDUCED DISORDER: ICD-10-CM

## 2024-10-16 DIAGNOSIS — F32.A DEPRESSION, UNSPECIFIED DEPRESSION TYPE: ICD-10-CM

## 2024-10-16 DIAGNOSIS — Z00.00 ROUTINE GENERAL MEDICAL EXAMINATION AT HEALTH CARE FACILITY: Primary | ICD-10-CM

## 2024-10-16 DIAGNOSIS — Z11.59 NEED FOR HEPATITIS C SCREENING TEST: ICD-10-CM

## 2024-10-16 DIAGNOSIS — K21.9 GASTROESOPHAGEAL REFLUX DISEASE WITHOUT ESOPHAGITIS: ICD-10-CM

## 2024-10-16 DIAGNOSIS — I10 BENIGN ESSENTIAL HTN: ICD-10-CM

## 2024-10-16 DIAGNOSIS — K63.5 POLYP OF COLON, UNSPECIFIED PART OF COLON, UNSPECIFIED TYPE: ICD-10-CM

## 2024-10-16 DIAGNOSIS — F41.9 ANXIETY: ICD-10-CM

## 2024-10-16 DIAGNOSIS — E55.9 VITAMIN D DEFICIENCY: ICD-10-CM

## 2024-10-16 PROCEDURE — G0439 PPPS, SUBSEQ VISIT: HCPCS | Performed by: INTERNAL MEDICINE

## 2024-10-16 PROCEDURE — 3008F BODY MASS INDEX DOCD: CPT | Performed by: INTERNAL MEDICINE

## 2024-10-16 PROCEDURE — G0296 VISIT TO DETERM LDCT ELIG: HCPCS | Performed by: INTERNAL MEDICINE

## 2024-10-16 PROCEDURE — 1159F MED LIST DOCD IN RCRD: CPT | Performed by: INTERNAL MEDICINE

## 2024-10-16 PROCEDURE — 99214 OFFICE O/P EST MOD 30 MIN: CPT | Performed by: INTERNAL MEDICINE

## 2024-10-16 PROCEDURE — 4004F PT TOBACCO SCREEN RCVD TLK: CPT | Performed by: INTERNAL MEDICINE

## 2024-10-16 PROCEDURE — 99406 BEHAV CHNG SMOKING 3-10 MIN: CPT | Performed by: INTERNAL MEDICINE

## 2024-10-16 PROCEDURE — 1160F RVW MEDS BY RX/DR IN RCRD: CPT | Performed by: INTERNAL MEDICINE

## 2024-10-16 PROCEDURE — 99397 PER PM REEVAL EST PAT 65+ YR: CPT | Performed by: INTERNAL MEDICINE

## 2024-10-16 PROCEDURE — 1124F ACP DISCUSS-NO DSCNMKR DOCD: CPT | Performed by: INTERNAL MEDICINE

## 2024-10-16 PROCEDURE — 3079F DIAST BP 80-89 MM HG: CPT | Performed by: INTERNAL MEDICINE

## 2024-10-16 PROCEDURE — 3074F SYST BP LT 130 MM HG: CPT | Performed by: INTERNAL MEDICINE

## 2024-10-16 PROCEDURE — 1170F FXNL STATUS ASSESSED: CPT | Performed by: INTERNAL MEDICINE

## 2024-10-16 RX ORDER — BUPROPION HYDROCHLORIDE 75 MG/1
75 TABLET ORAL 2 TIMES DAILY
Qty: 60 TABLET | Refills: 1 | Status: SHIPPED | OUTPATIENT
Start: 2024-10-16 | End: 2024-12-15

## 2024-10-16 ASSESSMENT — ENCOUNTER SYMPTOMS
FATIGUE: 0
WHEEZING: 0
DIARRHEA: 0
DIFFICULTY URINATING: 0
SINUS PAIN: 0
UNEXPECTED WEIGHT CHANGE: 0
ARTHRALGIAS: 0
SORE THROAT: 1
HEADACHES: 0
ABDOMINAL PAIN: 0
PALPITATIONS: 0
BLOOD IN STOOL: 0
DIZZINESS: 0
COUGH: 0
FEVER: 0
BRUISES/BLEEDS EASILY: 0

## 2024-10-16 ASSESSMENT — ACTIVITIES OF DAILY LIVING (ADL)
DOING_HOUSEWORK: INDEPENDENT
DRESSING: INDEPENDENT
MANAGING_FINANCES: INDEPENDENT
GROCERY_SHOPPING: INDEPENDENT
BATHING: INDEPENDENT
TAKING_MEDICATION: INDEPENDENT

## 2024-10-16 ASSESSMENT — PATIENT HEALTH QUESTIONNAIRE - PHQ9
SUM OF ALL RESPONSES TO PHQ9 QUESTIONS 1 AND 2: 1
1. LITTLE INTEREST OR PLEASURE IN DOING THINGS: SEVERAL DAYS
2. FEELING DOWN, DEPRESSED OR HOPELESS: NOT AT ALL

## 2024-10-16 NOTE — PROGRESS NOTES
"Subjective   Patient ID: Bao Toth is a 64 y.o. male who presents for Medicare Annual Wellness Visit Initial (Discuss anxiety med, would like cut back), Flu Vaccine (Decline), and Sore Throat (Sore throat x 6 weeks).    HPI       Review of Systems    Objective   No results found for: \"HGBA1C\"   /80   Pulse 85   Temp 36.1 °C (97 °F)   Ht 1.753 m (5' 9\")   Wt 72.1 kg (159 lb)   SpO2 97%   BMI 23.48 kg/m²     Physical Exam    Assessment/Plan   There are no diagnoses linked to this encounter.   "

## 2024-10-16 NOTE — PROGRESS NOTES
"Subjective   Reason for Visit: Bao Toth is an 64 y.o. male here for a Medicare Wellness visit.     Past Medical, Surgical, and Family History reviewed and updated in chart.    Reviewed all medications by prescribing practitioner or clinical pharmacist (such as prescriptions, OTCs, herbal therapies and supplements) and documented in the medical record.     Annual Medicare physical and preventative visit  - Vaccinations reviewed, declined vaccines  - Last screening colonoscopy 2016 strong family history of colon cancer in his father need to repeat colon cancer as recommended after recent discussion patient agreed referral to [facility] for colonoscopy  -Nicotine dependency 1 pack/day for many years  \"I spent more 3minutes counseling patient about need for smoking/tobacco cessation and how I can support efforts when patient is ready to quit.  Discussed nicotine replacement therapy, Varenicline, Bupropion, hypnosis, support groups, and accupunture as potential options.  Patient currently has no signs or symptoms of tobacco related disease.\".  Started on bupropion 75 mg twice a day patient did not tolerate high dose in the past  Recent blood work reviewed  -Needs complete blood work  -I discussed smoking history, patient meets criteria for lung cancer screening with low-dose CT scan. By using shared decision making we determined that patient would benefit from that screening including a discussion of benefits and harms of screening, follow-up diagnostic testing, over diagnosis, false positive  rate and total radiation exposure  I counseled the patient about the  importance of adhering to annual lung cancer low-dose CT screening, the impact of comorbidities and his  ability or willingness to undergo diagnosis and treatment if abnormality discovered. I provided patient an order for low-dose CT lung cancer screening.    Follow-up    - Chronic fatigue syndrome  Possible underlying sleep apnea patient counseled " "about sleep hygiene  - Patient drinks beer daily counseled about alcohol abstinence  - Underlying groin hernia seen by general surgery patient need to quit smoking first patient wants to delay any surgical intervention will follow patient conservatively follow-up to the emergency room if any signs of obstruction  -Elevated blood PSA, 4.5 asymptomatic may have underlying mild BPH repeat PSA   - Suspected sleep apnea refer patient to sleep clinic patient declined  -Hypertension controlled continue with metoprolol no palpitations  -Vitamin D deficiency continue on vitamin D 5000 daily over-the-counter  --Chronic reflux disease pantoprazole  Follow-up 3 months      Sore Throat   Pertinent negatives include no abdominal pain, congestion, coughing, diarrhea, ear discharge, ear pain or headaches.       Patient Care Team:  Artemio Rajput MD as PCP - General  Artemio Rajput MD as PCP - [employer] PCP     Review of Systems   Constitutional:  Negative for fatigue, fever and unexpected weight change.   HENT:  Positive for sore throat. Negative for congestion, ear discharge, ear pain, mouth sores and sinus pain.    Eyes:  Negative for visual disturbance.   Respiratory:  Negative for cough and wheezing.    Cardiovascular:  Negative for chest pain, palpitations and leg swelling.   Gastrointestinal:  Negative for abdominal pain, blood in stool and diarrhea.   Genitourinary:  Negative for difficulty urinating.   Musculoskeletal:  Negative for arthralgias.   Skin:  Negative for rash.   Neurological:  Negative for dizziness and headaches.   Hematological:  Does not bruise/bleed easily.   Psychiatric/Behavioral:  Negative for behavioral problems.    All other systems reviewed and are negative.      Objective   Vitals:  /80   Pulse 85   Temp 36.1 °C (97 °F)   Ht 1.753 m (5' 9\")   Wt 72.1 kg (159 lb)   SpO2 97%   BMI 23.48 kg/m²     Lab Results   Component Value Date    WBC 8.6 08/31/2023    HGB 15.8 " 08/31/2023    HCT 46.0 08/31/2023     08/31/2023    CHOL 177 08/31/2023    TRIG 83 08/31/2023    HDL 63.6 08/31/2023    ALT 17 08/31/2023    AST 19 08/31/2023     08/31/2023    K 4.6 08/31/2023     08/31/2023    CREATININE 0.58 08/31/2023    BUN 8 08/31/2023    CO2 28 08/31/2023    TSH 1.37 08/31/2023    INR 1.1 08/13/2018     par   Physical Exam  Vitals and nursing note reviewed.   Constitutional:       Appearance: Normal appearance.   HENT:      Head: Normocephalic.      Nose: Nose normal.   Eyes:      Conjunctiva/sclera: Conjunctivae normal.      Pupils: Pupils are equal, round, and reactive to light.   Cardiovascular:      Rate and Rhythm: Regular rhythm.   Pulmonary:      Effort: Pulmonary effort is normal.      Breath sounds: Normal breath sounds.   Abdominal:      General: Abdomen is flat.      Palpations: Abdomen is soft.   Musculoskeletal:      Cervical back: Neck supple.   Skin:     General: Skin is warm.   Neurological:      General: No focal deficit present.      Mental Status: He is oriented to person, place, and time.   Psychiatric:         Mood and Affect: Mood normal.         Assessment & Plan  Routine general medical examination at health care facility    Orders:    1 Year Follow Up In Primary Care - Wellness Exam; Future    CBC and Auto Differential; Future    Comprehensive Metabolic Panel; Future    Hemoglobin A1C; Future    Lipid Panel; Future    Magnesium; Future    Prostate Specific Antigen; Future    TSH with reflex to Free T4 if abnormal; Future    Vitamin D 25-Hydroxy,Total (for eval of Vitamin D levels); Future    Depression, unspecified depression type         Elevated PSA         Benign essential HTN    Orders:    CBC and Auto Differential; Future    Gastroesophageal reflux disease without esophagitis         Polyp of colon, unspecified part of colon, unspecified type    Orders:    Colonoscopy Screening; Average Risk Patient; Future    Need for hepatitis C screening  "test         Anxiety    Orders:    buPROPion (Wellbutrin) 75 mg tablet; Take 1 tablet (75 mg) by mouth 2 times a day.    Cigarette nicotine dependence with nicotine-induced disorder    Orders:    buPROPion (Wellbutrin) 75 mg tablet; Take 1 tablet (75 mg) by mouth 2 times a day.    CT lung screening low dose; Future    Vitamin D deficiency    Orders:    Vitamin D 25-Hydroxy,Total (for eval of Vitamin D levels); Future      Annual Medicare physical and preventative visit  - Vaccinations reviewed, declined vaccines  - Last screening colonoscopy 2016 strong family history of colon cancer in his father need to repeat colon cancer as recommended after recent discussion patient agreed referral to [facility] for colonoscopy  -Nicotine dependency 1 pack/day for many years  \"I spent more 3minutes counseling patient about need for smoking/tobacco cessation and how I can support efforts when patient is ready to quit.  Discussed nicotine replacement therapy, Varenicline, Bupropion, hypnosis, support groups, and accupunture as potential options.  Patient currently has no signs or symptoms of tobacco related disease.\".  Started on bupropion 75 mg twice a day patient did not tolerate high dose in the past  Recent blood work reviewed  -Needs complete blood work  -I discussed smoking history, patient meets criteria for lung cancer screening with low-dose CT scan. By using shared decision making we determined that patient would benefit from that screening including a discussion of benefits and harms of screening, follow-up diagnostic testing, over diagnosis, false positive  rate and total radiation exposure  I counseled the patient about the  importance of adhering to annual lung cancer low-dose CT screening, the impact of comorbidities and his  ability or willingness to undergo diagnosis and treatment if abnormality discovered. I provided patient an order for low-dose CT lung cancer screening.    Follow-up    - Chronic fatigue " syndrome  Possible underlying sleep apnea patient counseled about sleep hygiene  - Patient drinks beer daily counseled about alcohol abstinence  - Underlying groin hernia seen by general surgery patient need to quit smoking first patient wants to delay any surgical intervention will follow patient conservatively follow-up to the emergency room if any signs of obstruction  -Elevated blood PSA, 4.5 asymptomatic may have underlying mild BPH repeat PSA   - Suspected sleep apnea refer patient to sleep clinic patient declined  -Hypertension controlled continue with metoprolol no palpitations  -Vitamin D deficiency continue on vitamin D 5000 daily over-the-counter  --Chronic reflux disease pantoprazole  Follow-up 3 months

## 2024-10-18 ENCOUNTER — LAB (OUTPATIENT)
Dept: LAB | Facility: LAB | Age: 64
End: 2024-10-18
Payer: MEDICARE

## 2024-10-18 DIAGNOSIS — R97.20 HIGH PROSTATE SPECIFIC ANTIGEN (PSA): ICD-10-CM

## 2024-10-18 DIAGNOSIS — Z00.00 ROUTINE GENERAL MEDICAL EXAMINATION AT HEALTH CARE FACILITY: ICD-10-CM

## 2024-10-18 DIAGNOSIS — Z11.59 NEED FOR HEPATITIS C SCREENING TEST: ICD-10-CM

## 2024-10-18 DIAGNOSIS — E55.9 VITAMIN D DEFICIENCY: ICD-10-CM

## 2024-10-18 DIAGNOSIS — I10 BENIGN ESSENTIAL HTN: ICD-10-CM

## 2024-10-18 LAB
ALBUMIN SERPL BCP-MCNC: 4.5 G/DL (ref 3.4–5)
ALP SERPL-CCNC: 59 U/L (ref 33–136)
ALT SERPL W P-5'-P-CCNC: 17 U/L (ref 10–52)
ANION GAP SERPL CALC-SCNC: 11 MMOL/L (ref 10–20)
AST SERPL W P-5'-P-CCNC: 19 U/L (ref 9–39)
BASOPHILS # BLD AUTO: 0.07 X10*3/UL (ref 0–0.1)
BASOPHILS NFR BLD AUTO: 0.7 %
BILIRUB SERPL-MCNC: 0.8 MG/DL (ref 0–1.2)
BUN SERPL-MCNC: 5 MG/DL (ref 6–23)
CALCIUM SERPL-MCNC: 9.3 MG/DL (ref 8.6–10.3)
CHLORIDE SERPL-SCNC: 100 MMOL/L (ref 98–107)
CHOLEST SERPL-MCNC: 189 MG/DL (ref 0–199)
CHOLESTEROL/HDL RATIO: 3.2
CO2 SERPL-SCNC: 30 MMOL/L (ref 21–32)
CREAT SERPL-MCNC: 0.61 MG/DL (ref 0.5–1.3)
EGFRCR SERPLBLD CKD-EPI 2021: >90 ML/MIN/1.73M*2
EOSINOPHIL # BLD AUTO: 0.45 X10*3/UL (ref 0–0.7)
EOSINOPHIL NFR BLD AUTO: 4.3 %
ERYTHROCYTE [DISTWIDTH] IN BLOOD BY AUTOMATED COUNT: 12.3 % (ref 11.5–14.5)
GLUCOSE SERPL-MCNC: 85 MG/DL (ref 74–99)
HCT VFR BLD AUTO: 47.2 % (ref 41–52)
HDLC SERPL-MCNC: 60 MG/DL
HGB BLD-MCNC: 15.9 G/DL (ref 13.5–17.5)
IMM GRANULOCYTES # BLD AUTO: 0.04 X10*3/UL (ref 0–0.7)
IMM GRANULOCYTES NFR BLD AUTO: 0.4 % (ref 0–0.9)
LDLC SERPL CALC-MCNC: 101 MG/DL
LYMPHOCYTES # BLD AUTO: 2.77 X10*3/UL (ref 1.2–4.8)
LYMPHOCYTES NFR BLD AUTO: 26.4 %
MAGNESIUM SERPL-MCNC: 1.98 MG/DL (ref 1.6–2.4)
MCH RBC QN AUTO: 31.7 PG (ref 26–34)
MCHC RBC AUTO-ENTMCNC: 33.7 G/DL (ref 32–36)
MCV RBC AUTO: 94 FL (ref 80–100)
MONOCYTES # BLD AUTO: 1 X10*3/UL (ref 0.1–1)
MONOCYTES NFR BLD AUTO: 9.5 %
NEUTROPHILS # BLD AUTO: 6.16 X10*3/UL (ref 1.2–7.7)
NEUTROPHILS NFR BLD AUTO: 58.7 %
NON HDL CHOLESTEROL: 129 MG/DL (ref 0–149)
NRBC BLD-RTO: 0 /100 WBCS (ref 0–0)
PLATELET # BLD AUTO: 181 X10*3/UL (ref 150–450)
POTASSIUM SERPL-SCNC: 4 MMOL/L (ref 3.5–5.3)
PROT SERPL-MCNC: 7.2 G/DL (ref 6.4–8.2)
RBC # BLD AUTO: 5.02 X10*6/UL (ref 4.5–5.9)
SODIUM SERPL-SCNC: 137 MMOL/L (ref 136–145)
TRIGL SERPL-MCNC: 142 MG/DL (ref 0–149)
TSH SERPL-ACNC: 2.23 MIU/L (ref 0.44–3.98)
VLDL: 28 MG/DL (ref 0–40)
WBC # BLD AUTO: 10.5 X10*3/UL (ref 4.4–11.3)

## 2024-10-18 PROCEDURE — 82306 VITAMIN D 25 HYDROXY: CPT

## 2024-10-18 PROCEDURE — 36415 COLL VENOUS BLD VENIPUNCTURE: CPT

## 2024-10-18 PROCEDURE — 84153 ASSAY OF PSA TOTAL: CPT

## 2024-10-18 PROCEDURE — G0472 HEP C SCREEN HIGH RISK/OTHER: HCPCS

## 2024-10-18 PROCEDURE — 83036 HEMOGLOBIN GLYCOSYLATED A1C: CPT

## 2024-10-19 LAB
25(OH)D3 SERPL-MCNC: 78 NG/ML (ref 30–100)
EST. AVERAGE GLUCOSE BLD GHB EST-MCNC: 108 MG/DL
HBA1C MFR BLD: 5.4 %
HCV AB SER QL: NONREACTIVE
PSA SERPL-MCNC: 4.66 NG/ML
PSA SERPL-MCNC: 4.66 NG/ML

## 2024-10-21 ENCOUNTER — APPOINTMENT (OUTPATIENT)
Dept: UROLOGY | Facility: CLINIC | Age: 64
End: 2024-10-21
Payer: MEDICARE

## 2024-10-21 DIAGNOSIS — K62.89 ANAL PAIN: ICD-10-CM

## 2024-10-21 DIAGNOSIS — R97.20 ELEVATED PSA: ICD-10-CM

## 2024-10-21 DIAGNOSIS — N40.1 BENIGN PROSTATIC HYPERPLASIA WITH WEAK URINARY STREAM: Primary | ICD-10-CM

## 2024-10-21 DIAGNOSIS — R39.12 BENIGN PROSTATIC HYPERPLASIA WITH WEAK URINARY STREAM: Primary | ICD-10-CM

## 2024-10-21 PROCEDURE — 87086 URINE CULTURE/COLONY COUNT: CPT

## 2024-10-21 PROCEDURE — 99204 OFFICE O/P NEW MOD 45 MIN: CPT | Performed by: STUDENT IN AN ORGANIZED HEALTH CARE EDUCATION/TRAINING PROGRAM

## 2024-10-21 PROCEDURE — G2211 COMPLEX E/M VISIT ADD ON: HCPCS | Performed by: STUDENT IN AN ORGANIZED HEALTH CARE EDUCATION/TRAINING PROGRAM

## 2024-10-21 PROCEDURE — 81003 URINALYSIS AUTO W/O SCOPE: CPT

## 2024-10-21 NOTE — PROGRESS NOTES
Subjective   Patient ID: Bao Toth is a 64 y.o. male who presents as a new patient for establishment.     HPI  Boa Toth is a 64 y.o. male patient who was kindly referred by his PCP, Dr. Artemio Rajput, for elevated PSA. His last PSA was 4.66.    The patient has hesitancy and mild intermittent straining, especially at night. He is not voiding completely and has penile pain at times as well.     He has some sexual dysfunction but has not been sexually active in about 10 years. He has a h/o vasectomy.     PSA Trends  10/2024 - 4.66  08/2024 - 4.50  05/2022 - 3.69  05/2021 - 3.20    Review of Systems  A complete review of systems was performed. All systems are noted to be negative unless indicated in the history of present illness, impression, active problem list, or past histories.      Objective   Physical Exam  General: Well developed, well nourished, alert and cooperative, appears in no acute distress     Eyes: Non-injected conjunctiva, sclera clear, no proptosis     Cardiac: Extremities are warm and well perfused. No edema, cyanosis or pallor.     Lungs: Breathing is easy, non-labored. Speaking in clear and complete sentences. Normal diaphragmatic movement.     Abdomen: soft, non-tender     MSK: Ambulatory with steady gait, unassisted     Neuro: alert and oriented to person, place and time     Psych: Demonstrates good judgement and reason, without hallucinations, abnormal affect or abnormal behaviors.     Skin: no obvious lesions, no rashes.     : phallus circumcised, normal in size, no plaques or lesions. Testicles normal in size and texture, no masses     ISAIAH: prostate enlarged, smooth surface, no nodules     Right inguinal hernia present. Significant penile pain to touch.    Assessment/Plan   Diagnoses and all orders for this visit:  Benign prostatic hyperplasia with weak urinary stream  -     Urine Culture  -     Urinalysis with Reflex Microscopic  Anal pain  -     Referral to  Urology  Elevated PSA  -     Referral to Urology  -     MR prostate with delvin boundaries if pirads 3 or above; Future    Bao Toth is a 64 y.o. male patient who was kindly referred by his PCP, Dr. Artemio Rajput, for elevated PSA. His last PSA was 4.66.    The patient has hesitancy and mild intermittent straining, especially at night. He is not voiding completely and has penile pain at times as well.     He has some sexual dysfunction.     We spoke at length regarding what PSA is, how it is used as a screening measure for prostate cancer. We went into detail that PSA is only a screening test as there are other causes of elevated values such as BPH. In order to investigate the cause of the elevation in PSA, a repeat PSA and MR-prostate biopsy needs to be completed.     He may also have prostatitis and was started on alfuzosin 10 mg that will help with this as well as urinary symptoms.     Plan:  Plan for MR-prostate  FUV in 4-6 weeks with repeat PSA and UA U culture  Start alfuzosin 10 mg daily    Scribe Attestation  By signing my name below, IFaby, Luis   attest that this documentation has been prepared under the direction and in the presence of Karla Hamilton MD.

## 2024-10-21 NOTE — LETTER
October 22, 2024     Artemio Rajput MD  [address] [address]    Patient: Bao Toth   YOB: 1960   Date of Visit: 10/21/2024       Dear Dr. Artemio Rajput MD:    Thank you for referring Bao Toth to me for evaluation. Below are my notes for this consultation.  If you have questions, please do not hesitate to call me. I look forward to following your patient along with you.       Sincerely,     Karla Hamilton MD      CC: No Recipients  ______________________________________________________________________________________    Subjective  Patient ID: Bao Toth is a 64 y.o. male who presents as a new patient for establishment.     HPI  Bao Toth is a 64 y.o. male patient who was kindly referred by his PCP, Dr. Artemio Rajput, for elevated PSA. His last PSA was 4.66.    The patient has hesitancy and mild intermittent straining, especially at night. He is not voiding completely and has penile pain at times as well.     He has some sexual dysfunction but has not been sexually active in about 10 years. He has a h/o vasectomy.     PSA Trends  10/2024 - 4.66  08/2024 - 4.50  05/2022 - 3.69  05/2021 - 3.20    Review of Systems  A complete review of systems was performed. All systems are noted to be negative unless indicated in the history of present illness, impression, active problem list, or past histories.      Objective  Physical Exam  General: Well developed, well nourished, alert and cooperative, appears in no acute distress     Eyes: Non-injected conjunctiva, sclera clear, no proptosis     Cardiac: Extremities are warm and well perfused. No edema, cyanosis or pallor.     Lungs: Breathing is easy, non-labored. Speaking in clear and complete sentences. Normal diaphragmatic movement.     Abdomen: soft, non-tender     MSK: Ambulatory with steady gait, unassisted     Neuro: alert and oriented to person, place and time     Psych: Demonstrates good  judgement and reason, without hallucinations, abnormal affect or abnormal behaviors.     Skin: no obvious lesions, no rashes.     : phallus circumcised, normal in size, no plaques or lesions. Testicles normal in size and texture, no masses     ISAIAH: prostate enlarged, smooth surface, no nodules     Right inguinal hernia present. Significant penile pain to touch.    Assessment/Plan  Diagnoses and all orders for this visit:  Benign prostatic hyperplasia with weak urinary stream  -     Urine Culture  -     Urinalysis with Reflex Microscopic  Anal pain  -     Referral to Urology  Elevated PSA  -     Referral to Urology  -     MR prostate with delvin boundaries if pirads 3 or above; Future    Bao Toth is a 64 y.o. male patient who was kindly referred by his PCP, Dr. Artemio Rajput, for elevated PSA. His last PSA was 4.66.    The patient has hesitancy and mild intermittent straining, especially at night. He is not voiding completely and has penile pain at times as well.     He has some sexual dysfunction.     We spoke at length regarding what PSA is, how it is used as a screening measure for prostate cancer. We went into detail that PSA is only a screening test as there are other causes of elevated values such as BPH. In order to investigate the cause of the elevation in PSA, a repeat PSA and MR-prostate biopsy needs to be completed.     He may also have prostatitis and was started on alfuzosin 10 mg that will help with this as well as urinary symptoms.     Plan:  Plan for MR-prostate  FUV in 4-6 weeks with repeat PSA and UA U culture  Start alfuzosin 10 mg daily    Scribe Attestation  By signing my name below, IFaby Scribe   attest that this documentation has been prepared under the direction and in the presence of Karla Hamilton MD.

## 2024-10-22 DIAGNOSIS — Z12.11 COLON CANCER SCREENING: Primary | ICD-10-CM

## 2024-10-22 LAB
APPEARANCE UR: CLEAR
BILIRUB UR STRIP.AUTO-MCNC: NEGATIVE MG/DL
COLOR UR: YELLOW
GLUCOSE UR STRIP.AUTO-MCNC: NORMAL MG/DL
KETONES UR STRIP.AUTO-MCNC: ABNORMAL MG/DL
LEUKOCYTE ESTERASE UR QL STRIP.AUTO: NEGATIVE
NITRITE UR QL STRIP.AUTO: NEGATIVE
PH UR STRIP.AUTO: 6.5 [PH]
PROT UR STRIP.AUTO-MCNC: NEGATIVE MG/DL
RBC # UR STRIP.AUTO: NEGATIVE /UL
SP GR UR STRIP.AUTO: 1.01
UROBILINOGEN UR STRIP.AUTO-MCNC: NORMAL MG/DL

## 2024-10-22 RX ORDER — ALFUZOSIN HYDROCHLORIDE 10 MG/1
10 TABLET, EXTENDED RELEASE ORAL DAILY
Qty: 30 TABLET | Refills: 11 | Status: SHIPPED | OUTPATIENT
Start: 2024-10-22 | End: 2025-10-22

## 2024-10-23 ENCOUNTER — ANESTHESIA EVENT (OUTPATIENT)
Dept: GASTROENTEROLOGY | Facility: EXTERNAL LOCATION | Age: 64
End: 2024-10-23

## 2024-10-23 LAB — BACTERIA UR CULT: NO GROWTH

## 2024-10-23 RX ORDER — SOD SULF/POT CHLORIDE/MAG SULF 1.479 G
12 TABLET ORAL 2 TIMES DAILY
Qty: 24 TABLET | Refills: 0 | Status: SHIPPED | OUTPATIENT
Start: 2024-10-23 | End: 2024-10-25 | Stop reason: ALTCHOICE

## 2024-10-25 ENCOUNTER — HOSPITAL ENCOUNTER (OUTPATIENT)
Dept: GASTROENTEROLOGY | Facility: EXTERNAL LOCATION | Age: 64
Discharge: HOME | End: 2024-10-25
Payer: MEDICARE

## 2024-10-25 ENCOUNTER — ANESTHESIA (OUTPATIENT)
Dept: GASTROENTEROLOGY | Facility: EXTERNAL LOCATION | Age: 64
End: 2024-10-25

## 2024-10-25 VITALS
RESPIRATION RATE: 15 BRPM | DIASTOLIC BLOOD PRESSURE: 87 MMHG | TEMPERATURE: 97.2 F | OXYGEN SATURATION: 100 % | HEART RATE: 109 BPM | SYSTOLIC BLOOD PRESSURE: 136 MMHG

## 2024-10-25 DIAGNOSIS — K63.5 POLYP OF COLON, UNSPECIFIED PART OF COLON, UNSPECIFIED TYPE: ICD-10-CM

## 2024-10-25 PROCEDURE — G0121 COLON CA SCRN NOT HI RSK IND: HCPCS | Performed by: INTERNAL MEDICINE

## 2024-10-25 RX ORDER — LIDOCAINE HYDROCHLORIDE 20 MG/ML
INJECTION, SOLUTION INFILTRATION; PERINEURAL AS NEEDED
Status: DISCONTINUED | OUTPATIENT
Start: 2024-10-25 | End: 2024-10-26

## 2024-10-25 RX ORDER — PROPOFOL 10 MG/ML
INJECTION, EMULSION INTRAVENOUS AS NEEDED
Status: DISCONTINUED | OUTPATIENT
Start: 2024-10-25 | End: 2024-10-26

## 2024-10-25 SDOH — HEALTH STABILITY: MENTAL HEALTH: CURRENT SMOKER: 0

## 2024-10-25 ASSESSMENT — COLUMBIA-SUICIDE SEVERITY RATING SCALE - C-SSRS
1. IN THE PAST MONTH, HAVE YOU WISHED YOU WERE DEAD OR WISHED YOU COULD GO TO SLEEP AND NOT WAKE UP?: NO
6. HAVE YOU EVER DONE ANYTHING, STARTED TO DO ANYTHING, OR PREPARED TO DO ANYTHING TO END YOUR LIFE?: NO
2. HAVE YOU ACTUALLY HAD ANY THOUGHTS OF KILLING YOURSELF?: NO

## 2024-10-25 ASSESSMENT — PAIN SCALES - GENERAL
PAINLEVEL_OUTOF10: 0 - NO PAIN

## 2024-10-25 ASSESSMENT — PAIN - FUNCTIONAL ASSESSMENT
PAIN_FUNCTIONAL_ASSESSMENT: 0-10

## 2024-10-25 NOTE — H&P
Procedure H&P    Patient Profile-Procedures  Name Bao Toth  Date of Birth 1960  MRN [id number]  Address   [address] [place] [id number] [place] [address]    Primary Phone Number [phone]  Secondary Phone Number    Artemio Escalante    Procedure(s):  Procedures: Colonoscopy  Primary contact name and number   Extended Emergency Contact Information  Primary Emergency Contact: Yamilet Toth  Mobile Phone: [phone]  Relation: Daughter  Preferred language: English   needed? No    General Health  Weight There were no vitals filed for this visit.  BMI There is no height or weight on file to calculate BMI.    Allergies  No Known Allergies    Past Medical History   Past Medical History:   Diagnosis Date    Other chest pain 12/09/2018    Chest wall pain    Other chest pain 11/28/2022    Atypical chest pain    Personal history of (healed) traumatic fracture 08/21/2018    History of fracture of rib    Personal history of other diseases of male genital organs 08/15/2017    History of benign prostatic hyperplasia    Personal history of other diseases of the digestive system 06/11/2020    History of chronic gastritis    Personal history of other diseases of the respiratory system 03/15/2019    History of sinusitis    Personal history of other diseases of the respiratory system 06/27/2018    History of acute bronchitis       Provider assessment  Diagnosis: Colon Cancer Screening/Surveillance   Medication Reviewed - yes  Prior to Admission medications    Medication Sig Start Date End Date Taking? Authorizing Provider   metoprolol tartrate (Lopressor) 25 mg tablet Take 1 tablet (25 mg) by mouth once daily. 9/9/24  Yes Artemio Rajput MD   omeprazole (PriLOSEC) 40 mg DR capsule TAKE 1 CAPSULE EVERY DAY OPEN AND TAKE W SUGAR FREE PUDDING APPLESAUCE 8/5/24  Yes Artemio Rajput MD   alfuzosin (UroxatraL) 10 mg 24 hr tablet Take 1 tablet (10 mg) by mouth once daily. Do not  crush, chew, or split. 10/22/24 10/22/25  Karla Hamilton MD   buPROPion (Wellbutrin) 75 mg tablet Take 1 tablet (75 mg) by mouth 2 times a day. 10/16/24 12/15/24  Artemio Rajput MD   sod sulf-pot chloride-mag sulf (Sutab) 1.479-0.188- 0.225 gram tablet Take 12 tablets by mouth 2 times a day. 10/23/24 10/25/24  Nishant Gutierres MD       Physical Exam  There were no vitals filed for this visit.     General: A&Ox3, NAD.  HEENT: AT/NC.   CV: RRR. No murmur.  Resp: CTA bilaterally. No wheezing, rhonchi or rales.   GI: Soft, NT/ND. BSx4.  Extrem: No edema. Pulses intact.  Neuro: No focal deficits.   Psych: Normal mood and affect.      Procedure Plan - pre-procedural (re)assesment completed by physician:  discharge/transfer patient when discharge criteria met    ASA status 2  Mallampati score 2    Nishant Gutierres MD  10/25/2024 2:17 PM

## 2024-10-25 NOTE — DISCHARGE INSTRUCTIONS

## 2024-10-26 ASSESSMENT — PAIN SCALES - GENERAL: PAIN_LEVEL: 0

## 2024-10-26 NOTE — ANESTHESIA POSTPROCEDURE EVALUATION
Patient: Bao Toth    Procedure Summary       Date: 10/25/24 Room / Location: [facility]    Anesthesia Start: 1414 Anesthesia Stop: 1436    Procedure: COLONOSCOPY Diagnosis: Polyp of colon, unspecified part of colon, unspecified type    Scheduled Providers: Nishant Gutierres MD Responsible Provider: LITZY Toro    Anesthesia Type: MAC ASA Status: 3            Anesthesia Type: MAC    Vitals Value Taken Time   /87 10/25/24 1458   Temp 36.2 °C (97.2 °F) 10/25/24 1437   Pulse 109 10/25/24 1458   Resp 15 10/25/24 1458   SpO2 100 % 10/25/24 1458       Anesthesia Post Evaluation    Patient location during evaluation: bedside  Patient participation: complete - patient participated  Level of consciousness: awake  Pain score: 0  Pain management: adequate  Airway patency: patent  Cardiovascular status: acceptable  Respiratory status: acceptable  Hydration status: acceptable  Postoperative Nausea and Vomiting: none        No notable events documented.

## 2024-11-01 ENCOUNTER — HOSPITAL ENCOUNTER (OUTPATIENT)
Dept: RADIOLOGY | Facility: HOSPITAL | Age: 64
Discharge: HOME | End: 2024-11-01
Payer: MEDICARE

## 2024-11-01 DIAGNOSIS — F17.219 CIGARETTE NICOTINE DEPENDENCE WITH NICOTINE-INDUCED DISORDER: ICD-10-CM

## 2024-11-01 PROCEDURE — 71271 CT THORAX LUNG CANCER SCR C-: CPT

## 2024-11-06 ENCOUNTER — HOSPITAL ENCOUNTER (OUTPATIENT)
Dept: RADIOLOGY | Facility: HOSPITAL | Age: 64
Discharge: HOME | End: 2024-11-06
Payer: MEDICARE

## 2024-11-06 DIAGNOSIS — R97.20 ELEVATED PSA: ICD-10-CM

## 2024-11-06 PROCEDURE — 2550000001 HC RX 255 CONTRASTS: Performed by: STUDENT IN AN ORGANIZED HEALTH CARE EDUCATION/TRAINING PROGRAM

## 2024-11-06 PROCEDURE — A9575 INJ GADOTERATE MEGLUMI 0.1ML: HCPCS | Performed by: STUDENT IN AN ORGANIZED HEALTH CARE EDUCATION/TRAINING PROGRAM

## 2024-11-06 PROCEDURE — 72197 MRI PELVIS W/O & W/DYE: CPT

## 2024-11-06 RX ORDER — GADOTERATE MEGLUMINE 376.9 MG/ML
14 INJECTION INTRAVENOUS
Status: COMPLETED | OUTPATIENT
Start: 2024-11-06 | End: 2024-11-06

## 2024-11-06 ASSESSMENT — ENCOUNTER SYMPTOMS
LOSS OF SENSATION IN FEET: 0
OCCASIONAL FEELINGS OF UNSTEADINESS: 0
DEPRESSION: 0

## 2024-11-08 DIAGNOSIS — F41.9 ANXIETY: ICD-10-CM

## 2024-11-08 DIAGNOSIS — F17.219 CIGARETTE NICOTINE DEPENDENCE WITH NICOTINE-INDUCED DISORDER: ICD-10-CM

## 2024-11-08 RX ORDER — BUPROPION HYDROCHLORIDE 75 MG/1
75 TABLET ORAL 2 TIMES DAILY
Qty: 180 TABLET | Refills: 1 | Status: SHIPPED | OUTPATIENT
Start: 2024-11-08

## 2024-12-04 ENCOUNTER — APPOINTMENT (OUTPATIENT)
Dept: UROLOGY | Facility: CLINIC | Age: 64
End: 2024-12-04
Payer: MEDICARE

## 2024-12-10 NOTE — PROGRESS NOTES
Subjective   Patient ID: Bao Toth is a 64 y.o. male. He is currently prescribed alfuzosin 10 mg daily.       HPI  64 y.o. male presents with BPH with weak urinary stream and elevated PSA.     He has not taken the alfuzosin 10 mg daily. The patient has not been taking the metoprolol as prescribed. He has been taking the medication only in the morning instead of twice a day.     The urinary flow is not satisfactory. He notes that he is experiencing nocturia.    He states that he is experiencing pain inside the penis.     MR PROSTATE WITH GEN BOUNDARIES IF PIRADS 3 OR ABOVE; 11/6/2024   PROSTATE VOLUME:  The prostate measures 4.6 x 2.8 x 3.0 cm in right-to-left,  anterior-posterior and craniocaudal dimension.  Prostate weight is estimated at 20g. PSA density is 0.23 ng/mL/g.  IMPRESSION:  1.  BPH changes of the transition zone. Diffuse non nodular  hypointensities within the peripheral zone, without evidence of  focally restricted diffusion ( PI-RADS 2).    Lab Results   Component Value Date    PSA 4.66 (H) 10/18/2024        Review of Systems  A complete review of systems was performed. All systems are noted to be negative unless indicated in the history of present illness, impression, active problem list, or past histories.     Objective   Physical Exam    Assessment/Plan   Diagnoses and all orders for this visit:  Benign prostatic hyperplasia with lower urinary tract symptoms, symptom details unspecified  Elevated PSA  -     Prostate Specific Antigen; Future      64 y.o. male presents with BPH with weak urinary stream and elevated PSA.     I personally reviewed the MRI of the prostate. The MRI of the prostate report details no evidence of malignancy with a small prostate. The size of the prostate is a major contributor to the PSA. The prostate is within normal limits. I would like to see a lower PSA. The size of the prostate and PSA are not accurate. Therefore I would like to obtain an MRI.     One option  for the patient is to continue to monitor every 6 months to ensure that the PSA does not continue to elevated. The biopsy would be completed if it elevates greatly. The other option is to complete a MRI now and reassess when MRI results are obtained.     He would like to proceed with having another PSA level in 6 months.     The patient was advised that he can take the alpha and beta blocker medications together. The first few days after taking the alfuzosin at night, I advised the patient he might have mild symptoms of dizziness. These symptoms should stop after a few days.     I advised him in cases of severe dizziness to get his BP checked.    Patient understands the situation and would proceed as instructed.    Plan:  Start alfuzosin 10 mg daily at bedtime  PSA mid 05/2025  FUV 05/2025    Scribe Attestation   By signing my name below, Eb [place], Scribe attestation that this documentation has been prepared under the direction and in the presence of Karla Hamilton MD.

## 2024-12-11 ENCOUNTER — APPOINTMENT (OUTPATIENT)
Dept: UROLOGY | Facility: CLINIC | Age: 64
End: 2024-12-11
Payer: MEDICARE

## 2024-12-11 DIAGNOSIS — N40.1 BENIGN PROSTATIC HYPERPLASIA WITH LOWER URINARY TRACT SYMPTOMS, SYMPTOM DETAILS UNSPECIFIED: Primary | ICD-10-CM

## 2024-12-11 DIAGNOSIS — R97.20 ELEVATED PSA: ICD-10-CM

## 2024-12-11 PROCEDURE — G2211 COMPLEX E/M VISIT ADD ON: HCPCS | Performed by: STUDENT IN AN ORGANIZED HEALTH CARE EDUCATION/TRAINING PROGRAM

## 2024-12-11 PROCEDURE — 99214 OFFICE O/P EST MOD 30 MIN: CPT | Performed by: STUDENT IN AN ORGANIZED HEALTH CARE EDUCATION/TRAINING PROGRAM

## 2025-01-16 ENCOUNTER — APPOINTMENT (OUTPATIENT)
Dept: PRIMARY CARE | Facility: CLINIC | Age: 65
End: 2025-01-16
Payer: MEDICARE

## 2025-01-29 ENCOUNTER — APPOINTMENT (OUTPATIENT)
Dept: PREADMISSION TESTING | Facility: HOSPITAL | Age: 65
End: 2025-01-29
Payer: MEDICARE

## 2025-02-07 DIAGNOSIS — R10.13 EPIGASTRIC PAIN: ICD-10-CM

## 2025-02-07 RX ORDER — OMEPRAZOLE 40 MG/1
CAPSULE, DELAYED RELEASE ORAL
Qty: 30 CAPSULE | Refills: 0 | Status: SHIPPED | OUTPATIENT
Start: 2025-02-07

## 2025-02-19 ENCOUNTER — APPOINTMENT (OUTPATIENT)
Dept: GASTROENTEROLOGY | Facility: HOSPITAL | Age: 65
End: 2025-02-19
Payer: MEDICARE

## 2025-02-21 DIAGNOSIS — R10.13 EPIGASTRIC PAIN: ICD-10-CM

## 2025-02-21 RX ORDER — OMEPRAZOLE 40 MG/1
CAPSULE, DELAYED RELEASE ORAL
Qty: 90 CAPSULE | Refills: 1 | Status: SHIPPED | OUTPATIENT
Start: 2025-02-21

## 2025-04-24 ENCOUNTER — OFFICE VISIT (OUTPATIENT)
Dept: PRIMARY CARE | Facility: CLINIC | Age: 65
End: 2025-04-24
Payer: MEDICARE

## 2025-04-24 VITALS
BODY MASS INDEX: 23.4 KG/M2 | WEIGHT: 158 LBS | TEMPERATURE: 97.4 F | OXYGEN SATURATION: 92 % | HEART RATE: 80 BPM | DIASTOLIC BLOOD PRESSURE: 82 MMHG | SYSTOLIC BLOOD PRESSURE: 140 MMHG

## 2025-04-24 DIAGNOSIS — I10 BENIGN ESSENTIAL HTN: ICD-10-CM

## 2025-04-24 DIAGNOSIS — R06.02 SHORTNESS OF BREATH: ICD-10-CM

## 2025-04-24 DIAGNOSIS — F41.9 ANXIETY: ICD-10-CM

## 2025-04-24 DIAGNOSIS — F17.219 CIGARETTE NICOTINE DEPENDENCE WITH NICOTINE-INDUCED DISORDER: ICD-10-CM

## 2025-04-24 DIAGNOSIS — R93.1 HIGH CORONARY ARTERY CALCIUM SCORE: Primary | ICD-10-CM

## 2025-04-24 DIAGNOSIS — R07.9 CHEST PAIN, UNSPECIFIED TYPE: ICD-10-CM

## 2025-04-24 PROCEDURE — 3077F SYST BP >= 140 MM HG: CPT | Performed by: INTERNAL MEDICINE

## 2025-04-24 PROCEDURE — 1159F MED LIST DOCD IN RCRD: CPT | Performed by: INTERNAL MEDICINE

## 2025-04-24 PROCEDURE — 99214 OFFICE O/P EST MOD 30 MIN: CPT | Performed by: INTERNAL MEDICINE

## 2025-04-24 PROCEDURE — 4004F PT TOBACCO SCREEN RCVD TLK: CPT | Performed by: INTERNAL MEDICINE

## 2025-04-24 PROCEDURE — G2211 COMPLEX E/M VISIT ADD ON: HCPCS | Performed by: INTERNAL MEDICINE

## 2025-04-24 PROCEDURE — 3079F DIAST BP 80-89 MM HG: CPT | Performed by: INTERNAL MEDICINE

## 2025-04-24 PROCEDURE — 1160F RVW MEDS BY RX/DR IN RCRD: CPT | Performed by: INTERNAL MEDICINE

## 2025-04-24 RX ORDER — METOPROLOL TARTRATE 25 MG/1
25 TABLET, FILM COATED ORAL DAILY
Qty: 90 TABLET | Refills: 1 | Status: SHIPPED | OUTPATIENT
Start: 2025-04-24

## 2025-04-24 RX ORDER — BUPROPION HYDROCHLORIDE 75 MG/1
75 TABLET ORAL 2 TIMES DAILY
Qty: 180 TABLET | Refills: 1 | Status: CANCELLED | OUTPATIENT
Start: 2025-04-24

## 2025-04-24 ASSESSMENT — ENCOUNTER SYMPTOMS
ABDOMINAL PAIN: 0
FATIGUE: 0
DIZZINESS: 0
SHORTNESS OF BREATH: 1
FEVER: 0
UNEXPECTED WEIGHT CHANGE: 0
HYPERTENSION: 1
DIARRHEA: 0
ARTHRALGIAS: 0
BLOOD IN STOOL: 0
HEADACHES: 0
SINUS PAIN: 0
COUGH: 0
WHEEZING: 0
PALPITATIONS: 0
BRUISES/BLEEDS EASILY: 0
SORE THROAT: 0
DIFFICULTY URINATING: 0

## 2025-04-24 NOTE — PROGRESS NOTES
Subjective   Patient ID: Bao Toth is a 65 y.o. male who presents for Hypertension, Shortness of Breath (SOB worse since COVID in February, worse with exertion ), and spots (Spots on either side of face).    --Recent blood work and plan of care reviewed  -Schedule colonoscopy obtained in October 2024  Repeat colonoscopy in 7 years, due: 10/24/2031  - Low-dose CT scanning lung cancer screening obtained in November 2020 4 repeat in 1 year  - Underlying high coronary calcium score patient symptomatic shortness of breath with exertion atypical left-sided chest pain  Referred patient to cardiology Dr. Pickard for cardiac workup  - Nicotine dependency counseled on smoking cessation  - Patient drinks beer daily counseled about alcohol abstinence  - Underlying groin hernia seen by general surgery patient need to quit smoking first patient wants to delay any surgical intervention will follow patient conservatively follow-up to the emergency room if any signs of obstruction  -Elevated blood PSA, 4.5 asymptomatic may have underlying mild BPH repeat PSA follow-up urology recommendation for biopsy follow-up closely as needed  - Suspected sleep apnea refer patient to sleep clinic patient declined  -Hypertension controlled continue with metoprolol no palpitations  -Vitamin D deficiency continue on vitamin D 5000 daily over-the-counter  --Chronic reflux disease pantoprazole  Follow-up 3 months         Hypertension  Associated symptoms include shortness of breath. Pertinent negatives include no chest pain, headaches or palpitations.   Shortness of Breath  Pertinent negatives include no abdominal pain, chest pain, ear pain, fever, headaches, leg swelling, rash, sore throat or wheezing.          Review of Systems   Constitutional:  Negative for fatigue, fever and unexpected weight change.   HENT:  Negative for congestion, ear discharge, ear pain, mouth sores, sinus pain and sore throat.    Eyes:  Negative for visual  disturbance.   Respiratory:  Positive for shortness of breath. Negative for cough and wheezing.    Cardiovascular:  Negative for chest pain, palpitations and leg swelling.   Gastrointestinal:  Negative for abdominal pain, blood in stool and diarrhea.   Genitourinary:  Negative for difficulty urinating.   Musculoskeletal:  Negative for arthralgias.   Skin:  Negative for rash.   Neurological:  Negative for dizziness and headaches.   Hematological:  Does not bruise/bleed easily.   Psychiatric/Behavioral:  Negative for behavioral problems.    All other systems reviewed and are negative.      Objective   Lab Results   Component Value Date    HGBA1C 5.4 10/18/2024      /82   Pulse 80   Temp 36.3 °C (97.4 °F)   Wt 71.7 kg (158 lb)   SpO2 92%   BMI 23.40 kg/m²     Physical Exam  Vitals and nursing note reviewed.   Constitutional:       Appearance: Normal appearance.   HENT:      Head: Normocephalic.      Nose: Nose normal.   Eyes:      Conjunctiva/sclera: Conjunctivae normal.      Pupils: Pupils are equal, round, and reactive to light.   Cardiovascular:      Rate and Rhythm: Regular rhythm.   Pulmonary:      Effort: Pulmonary effort is normal.      Breath sounds: Normal breath sounds.   Abdominal:      General: Abdomen is flat.      Palpations: Abdomen is soft.   Musculoskeletal:      Cervical back: Neck supple.   Skin:     General: Skin is warm.   Neurological:      General: No focal deficit present.      Mental Status: He is oriented to person, place, and time.   Psychiatric:         Mood and Affect: Mood normal.         Assessment/Plan   Bao was seen today for hypertension, shortness of breath and spots.  Diagnoses and all orders for this visit:  High coronary artery calcium score (Primary)  -     Referral to Cardiology; Future  Benign essential HTN  -     metoprolol tartrate (Lopressor) 25 mg tablet; Take 1 tablet (25 mg) by mouth once daily.  Anxiety  Cigarette nicotine dependence with nicotine-induced  disorder  Shortness of breath  -     Referral to Cardiology; Future  Chest pain, unspecified type  -     Referral to Cardiology; Future  Other orders  -     Follow Up In Primary Care - Established; Future   --Recent blood work and plan of care reviewed  -Schedule colonoscopy obtained in October 2024  Repeat colonoscopy in 7 years, due: 10/24/2031  - Low-dose CT scanning lung cancer screening obtained in November 2020 4 repeat in 1 year  - Underlying high coronary calcium score patient symptomatic shortness of breath with exertion atypical left-sided chest pain  Referred patient to cardiology Dr. Pickard for cardiac workup  - Nicotine dependency counseled on smoking cessation  - Patient drinks beer daily counseled about alcohol abstinence  - Underlying groin hernia seen by general surgery patient need to quit smoking first patient wants to delay any surgical intervention will follow patient conservatively follow-up to the emergency room if any signs of obstruction  -Elevated blood PSA, 4.5 asymptomatic may have underlying mild BPH repeat PSA follow-up urology recommendation for biopsy follow-up closely as needed  - Suspected sleep apnea refer patient to sleep clinic patient declined  -Hypertension controlled continue with metoprolol no palpitations  -Vitamin D deficiency continue on vitamin D 5000 daily over-the-counter  --Chronic reflux disease pantoprazole  Follow-up 3 months

## 2025-04-25 LAB — PSA SERPL-MCNC: 6.08 NG/ML

## 2025-05-18 NOTE — PROGRESS NOTES
Subjective   Patient ID: Bao Toth is a 65 y.o. male.      HPI  65 y.o. male presents for a follow up visit regarding and elevated PSA level and BPH with weak urinary stream. PSA level 6.08.    The patient was to start alfuzosin 10 mg daily at bedtime. Since starting the medication, he has noticed side effects such as dizziness. He states that he is able to handle the weak urinary stream.     He has not been sexually active for several years. He states that he does not experience nocturnal penile tumescence.     Lab Results   Component Value Date    PSA 6.08 (H) 04/24/2025    PSA 4.66 (H) 10/18/2024         MR PROSTATE WITH GEN BOUNDARIES IF PIRADS 3 OR ABOVE; 11/6/2024   PROSTATE VOLUME:  The prostate measures 4.6 x 2.8 x 3.0 cm in right-to-left,  anterior-posterior and craniocaudal dimension.  Prostate weight is estimated at 20g. PSA density is 0.23 ng/mL/g.  IMPRESSION:  1.  BPH changes of the transition zone. Diffuse non nodular  hypointensities within the peripheral zone, without evidence of  focally restricted diffusion ( PI-RADS 2).    Review of Systems  A complete review of systems was performed. All systems are noted to be negative unless indicated in the history of present illness, impression, active problem list, or past histories.     Objective   Physical Exam    Assessment/Plan   Diagnoses and all orders for this visit:  Elevated PSA  Benign prostatic hyperplasia with weak urinary stream  -     Urine Culture; Future  -     tadalafil (Cialis) 5 mg tablet; Take 1 tablet (5 mg) by mouth once daily.  Prophylactic antibiotic  -     ciprofloxacin (Cipro) 500 mg tablet; Take 1 pill the night before the procedure, 1 pill the morning of the procedure, and 1 pill the night of the procedure.      65 y.o. male presents for a follow up visit regarding and elevated PSA level and BPH with weak urinary stream. PSA level 6.08    As the patient is experiencing side effects of the alfuzosin, we will discontinue  the medication. He is also experiencing ED in relation to his LUTS. My recommendation would be for the patient to start Cialis 5 mg daily. Mechanism of action, risks, benefits, alternatives and side effects reviewed. He would like to proceed.     I personally reviewed the PSA level that is currently elevated at 6.08. It has risen from 4.66, 6 months ago.     I personally reviewed that the patient's MRI of the prostate. The results indicated a prostate size of 20 grams. This size would not be the etiology of the patient's elevated PSA level.     Considering the patient's age and his health, my recommendation would be for him to undergo a biopsy of the prostate.     I had a discussion with the patient regarding his rising PSA and the setting of a negative MRI. I reviewed with him that while I reviewed the MRI he has a negative predictive value ranging in the literature between 80% and 95%, however this is mostly for clinically significant prostate cancers, and a negative MRI does not rule out low-grade cancers. Because of the trend of the PSA upwards, and the lack of explanation of this high PSA with other findings on the MRI such as inflammatory changes or size/density, then I advised for systemic TRUS biopsy in clinic. I reviewed the risks and benefits of the biopsy, including hematuria, and hematochezia for few days, pelvic soreness/pain for a few days, the risks of exacerbation of urinary symptoms up to the level of complete urinary tension necessitating any changes in the patient's health, fever, or chills, generalized fatigue, then he needs to seek immediate medical attention to start antibiotics and prevent exacerbation of his situation.    Patient understands his situation and agreed to proceed with systematic biopsy.    Plan:  UCx  TRUS biopsy in clinic in early 07/2025  Start Cialis 5 mg daily   Discontinue alfuzosin 10 mg daily at bedtime         Scribe Attestation   By signing my name below, Eb FERRERA  Luis Dewitt attestation that this documentation has been prepared under the direction and in the presence of Karla Hamilton MD.

## 2025-05-19 ENCOUNTER — APPOINTMENT (OUTPATIENT)
Dept: UROLOGY | Facility: CLINIC | Age: 65
End: 2025-05-19
Payer: MEDICARE

## 2025-05-19 DIAGNOSIS — R97.20 ELEVATED PSA: Primary | ICD-10-CM

## 2025-05-19 DIAGNOSIS — R39.12 BENIGN PROSTATIC HYPERPLASIA WITH WEAK URINARY STREAM: ICD-10-CM

## 2025-05-19 DIAGNOSIS — N40.1 BENIGN PROSTATIC HYPERPLASIA WITH WEAK URINARY STREAM: ICD-10-CM

## 2025-05-19 DIAGNOSIS — Z79.2 PROPHYLACTIC ANTIBIOTIC: ICD-10-CM

## 2025-05-19 PROCEDURE — G2211 COMPLEX E/M VISIT ADD ON: HCPCS | Performed by: STUDENT IN AN ORGANIZED HEALTH CARE EDUCATION/TRAINING PROGRAM

## 2025-05-19 PROCEDURE — 99214 OFFICE O/P EST MOD 30 MIN: CPT | Performed by: STUDENT IN AN ORGANIZED HEALTH CARE EDUCATION/TRAINING PROGRAM

## 2025-05-20 ENCOUNTER — HOSPITAL ENCOUNTER (OUTPATIENT)
Dept: RADIOLOGY | Facility: HOSPITAL | Age: 65
Discharge: HOME | End: 2025-05-20
Payer: MEDICARE

## 2025-05-20 DIAGNOSIS — Z82.49 FAMILY HISTORY OF ISCHEMIC HEART DISEASE AND OTHER DISEASES OF THE CIRCULATORY SYSTEM: ICD-10-CM

## 2025-05-20 PROCEDURE — 75571 CT HRT W/O DYE W/CA TEST: CPT

## 2025-05-20 RX ORDER — CIPROFLOXACIN 500 MG/1
TABLET, FILM COATED ORAL
Qty: 3 TABLET | Refills: 0 | Status: SHIPPED | OUTPATIENT
Start: 2025-05-20

## 2025-05-20 RX ORDER — TADALAFIL 5 MG/1
5 TABLET ORAL DAILY
Qty: 30 TABLET | Refills: 11 | Status: SHIPPED | OUTPATIENT
Start: 2025-05-20 | End: 2026-05-20

## 2025-07-08 NOTE — H&P (VIEW-ONLY)
Subjective   Patient ID: Bao Toth is a 65 y.o. male.      HPI  65 y.o. male presented for a TRUS prostate biopsy today in office due to an elevated PSA level of 6.08.      Lab Results   Component Value Date    PSA 6.08 (H) 04/24/2025    PSA 4.66 (H) 10/18/2024      MR PROSTATE WITH GEN BOUNDARIES IF PIRADS 3 OR ABOVE; 11/6/2024   PROSTATE VOLUME:  The prostate measures 4.6 x 2.8 x 3.0 cm in right-to-left,  anterior-posterior and craniocaudal dimension.  Prostate weight is estimated at 20g. PSA density is 0.23 ng/mL/g.  IMPRESSION:  1.  BPH changes of the transition zone. Diffuse non nodular  hypointensities within the peripheral zone, without evidence of  focally restricted diffusion ( PI-RADS 2).    Review of Systems  A complete review of systems was performed. All systems are noted to be negative unless indicated in the history of present illness, impression, active problem list, or past histories.     Objective   Physical Exam  The patient taken to the procedure room, was prepped and draped.   Patient was unable to tolerate the ultrasound probe being inserted into his rectum due to a tight rectal sphincter.     Assessment/Plan   Diagnoses and all orders for this visit:  Prophylactic antibiotic  -     amikacin (Amikin) injection 1,000 mg  -     ciprofloxacin (Cipro) 500 mg tablet; Take 1 pill the night before the procedure, 1 pill the morning of the procedure, and 1 pill the night of the procedure.  Elevated PSA  -     Surgical Pathology Exam  -     Urine Culture; Future  -     Case Request Operating Room: BIOPSY, PROSTATE, WITH IMAGING GUIDANCE; Standing  Lower urinary tract symptoms (LUTS)  -     Urine Culture; Future  Other orders  -     NPO Diet Except: Sips with meds; Effective now; Standing  -     Insert and maintain peripheral IV; Standing  -     Saline lock IV; Standing  -     Place in outpatient/hospital ambulatory surgery; Standing  -     Full code; Standing  -     gentamicin (Garamycin) 80 mg  in sodium chloride 0.9% 100 mL IV    65 y.o. male presented for a TRUS prostate biopsy today in office due to an elevated PSA level of 6.08.    The patient was unable to tolerate the ultrasound probe being inserted into the rectum due to a tight sphincter. As a result, he asked to reschedule and be completed under sedation in the operating room. He is rescheduled for a TRUS fusion biopsy on 07/18/2025 in [place].     We discussed the role of sedation in the procedure, and the risks and benefits, as well as the reason why the biopsy is indicated for him. He would like to proceed    Plan:  TRUS fusion biopsy on 07/18/2025 in [place]    Scribe Attestation   By signing my name below, I, Eb Dewitt, Scribe attestation that this documentation has been prepared under the direction and in the presence of Karla Hamilton MD.

## 2025-07-08 NOTE — PROGRESS NOTES
Subjective   Patient ID: Bao Toth is a 65 y.o. male.      HPI  65 y.o. male presented for a TRUS prostate biopsy today in office due to an elevated PSA level of 6.08.      Lab Results   Component Value Date    PSA 6.08 (H) 04/24/2025    PSA 4.66 (H) 10/18/2024      MR PROSTATE WITH GEN BOUNDARIES IF PIRADS 3 OR ABOVE; 11/6/2024   PROSTATE VOLUME:  The prostate measures 4.6 x 2.8 x 3.0 cm in right-to-left,  anterior-posterior and craniocaudal dimension.  Prostate weight is estimated at 20g. PSA density is 0.23 ng/mL/g.  IMPRESSION:  1.  BPH changes of the transition zone. Diffuse non nodular  hypointensities within the peripheral zone, without evidence of  focally restricted diffusion ( PI-RADS 2).    Review of Systems  A complete review of systems was performed. All systems are noted to be negative unless indicated in the history of present illness, impression, active problem list, or past histories.     Objective   Physical Exam  The patient taken to the procedure room, was prepped and draped.   Patient was unable to tolerate the ultrasound probe being inserted into his rectum due to a tight rectal sphincter.     Assessment/Plan   Diagnoses and all orders for this visit:  Prophylactic antibiotic  -     amikacin (Amikin) injection 1,000 mg  -     ciprofloxacin (Cipro) 500 mg tablet; Take 1 pill the night before the procedure, 1 pill the morning of the procedure, and 1 pill the night of the procedure.  Elevated PSA  -     Surgical Pathology Exam  -     Urine Culture; Future  -     Case Request Operating Room: BIOPSY, PROSTATE, WITH IMAGING GUIDANCE; Standing  Lower urinary tract symptoms (LUTS)  -     Urine Culture; Future  Other orders  -     NPO Diet Except: Sips with meds; Effective now; Standing  -     Insert and maintain peripheral IV; Standing  -     Saline lock IV; Standing  -     Place in outpatient/hospital ambulatory surgery; Standing  -     Full code; Standing  -     gentamicin (Garamycin) 80 mg  in sodium chloride 0.9% 100 mL IV    65 y.o. male presented for a TRUS prostate biopsy today in office due to an elevated PSA level of 6.08.    The patient was unable to tolerate the ultrasound probe being inserted into the rectum due to a tight sphincter. As a result, he asked to reschedule and be completed under sedation in the operating room. He is rescheduled for a TRUS fusion biopsy on 07/18/2025 in [place].     We discussed the role of sedation in the procedure, and the risks and benefits, as well as the reason why the biopsy is indicated for him. He would like to proceed    Plan:  TRUS fusion biopsy on 07/18/2025 in [place]    Scribe Attestation   By signing my name below, I, Eb Dewitt, Scribe attestation that this documentation has been prepared under the direction and in the presence of Karla Hamilton MD.

## 2025-07-09 ENCOUNTER — APPOINTMENT (OUTPATIENT)
Dept: UROLOGY | Facility: CLINIC | Age: 65
End: 2025-07-09
Payer: MEDICARE

## 2025-07-09 DIAGNOSIS — R97.20 ELEVATED PSA: ICD-10-CM

## 2025-07-09 DIAGNOSIS — Z79.2 PROPHYLACTIC ANTIBIOTIC: Primary | ICD-10-CM

## 2025-07-09 DIAGNOSIS — R39.9 LOWER URINARY TRACT SYMPTOMS (LUTS): ICD-10-CM

## 2025-07-09 LAB
POC APPEARANCE, URINE: CLEAR
POC BILIRUBIN, URINE: NEGATIVE
POC BLOOD, URINE: NEGATIVE
POC COLOR, URINE: YELLOW
POC GLUCOSE, URINE: NEGATIVE MG/DL
POC KETONES, URINE: ABNORMAL MG/DL
POC LEUKOCYTES, URINE: NEGATIVE
POC NITRITE,URINE: NEGATIVE
POC PH, URINE: 6.5 PH
POC PROTEIN, URINE: NEGATIVE MG/DL
POC SPECIFIC GRAVITY, URINE: 1.02
POC UROBILINOGEN, URINE: 0.2 EU/DL

## 2025-07-09 PROCEDURE — 99214 OFFICE O/P EST MOD 30 MIN: CPT | Performed by: STUDENT IN AN ORGANIZED HEALTH CARE EDUCATION/TRAINING PROGRAM

## 2025-07-09 PROCEDURE — 96372 THER/PROPH/DIAG INJ SC/IM: CPT | Performed by: STUDENT IN AN ORGANIZED HEALTH CARE EDUCATION/TRAINING PROGRAM

## 2025-07-09 RX ORDER — AMIKACIN SULFATE 250 MG/ML
14 INJECTION, SOLUTION INTRAMUSCULAR; INTRAVENOUS ONCE
Status: COMPLETED | OUTPATIENT
Start: 2025-07-09 | End: 2025-07-09

## 2025-07-09 RX ADMIN — AMIKACIN SULFATE 1000 MG: 250 INJECTION, SOLUTION INTRAMUSCULAR; INTRAVENOUS at 11:49

## 2025-07-09 ASSESSMENT — PAIN SCALES - GENERAL: PAINLEVEL_OUTOF10: 0-NO PAIN

## 2025-07-10 RX ORDER — CIPROFLOXACIN 500 MG/1
TABLET, FILM COATED ORAL
Qty: 3 TABLET | Refills: 0 | Status: SHIPPED | OUTPATIENT
Start: 2025-07-10

## 2025-07-14 ENCOUNTER — HOSPITAL ENCOUNTER (OUTPATIENT)
Dept: CARDIOLOGY | Facility: HOSPITAL | Age: 65
Discharge: HOME | End: 2025-07-14
Payer: MEDICARE

## 2025-07-14 ENCOUNTER — ANESTHESIA EVENT (OUTPATIENT)
Dept: OPERATING ROOM | Facility: HOSPITAL | Age: 65
End: 2025-07-14
Payer: MEDICARE

## 2025-07-14 ENCOUNTER — PRE-ADMISSION TESTING (OUTPATIENT)
Dept: PREADMISSION TESTING | Facility: HOSPITAL | Age: 65
End: 2025-07-14
Payer: MEDICARE

## 2025-07-14 ENCOUNTER — LAB (OUTPATIENT)
Dept: LAB | Facility: HOSPITAL | Age: 65
End: 2025-07-14
Payer: MEDICARE

## 2025-07-14 VITALS — WEIGHT: 159 LBS | BODY MASS INDEX: 23.55 KG/M2

## 2025-07-14 DIAGNOSIS — R97.20 ELEVATED PSA: Primary | ICD-10-CM

## 2025-07-14 PROBLEM — F17.210 DEPENDENCE ON NICOTINE FROM CIGARETTES: Status: ACTIVE | Noted: 2025-07-14

## 2025-07-14 LAB
ANION GAP SERPL CALC-SCNC: 12 MMOL/L (ref 10–20)
ATRIAL RATE: 79 BPM
BUN SERPL-MCNC: 4 MG/DL (ref 6–23)
CALCIUM SERPL-MCNC: 9.3 MG/DL (ref 8.6–10.3)
CHLORIDE SERPL-SCNC: 100 MMOL/L (ref 98–107)
CO2 SERPL-SCNC: 26 MMOL/L (ref 21–32)
CREAT SERPL-MCNC: 0.67 MG/DL (ref 0.5–1.3)
EGFRCR SERPLBLD CKD-EPI 2021: >90 ML/MIN/1.73M*2
GLUCOSE SERPL-MCNC: 99 MG/DL (ref 74–99)
P AXIS: 42 DEGREES
P OFFSET: 201 MS
P ONSET: 147 MS
POTASSIUM SERPL-SCNC: 3.8 MMOL/L (ref 3.5–5.3)
PR INTERVAL: 144 MS
Q ONSET: 219 MS
QRS COUNT: 13 BEATS
QRS DURATION: 92 MS
QT INTERVAL: 362 MS
QTC CALCULATION(BAZETT): 415 MS
QTC FREDERICIA: 396 MS
R AXIS: -6 DEGREES
SODIUM SERPL-SCNC: 134 MMOL/L (ref 136–145)
T AXIS: 14 DEGREES
T OFFSET: 400 MS
VENTRICULAR RATE: 79 BPM

## 2025-07-14 PROCEDURE — 80048 BASIC METABOLIC PNL TOTAL CA: CPT

## 2025-07-14 PROCEDURE — 93005 ELECTROCARDIOGRAM TRACING: CPT

## 2025-07-14 RX ORDER — ATORVASTATIN CALCIUM 40 MG/1
1 TABLET, FILM COATED ORAL
COMMUNITY
Start: 2025-05-22

## 2025-07-14 SDOH — HEALTH STABILITY: MENTAL HEALTH: CURRENT SMOKER: 1

## 2025-07-14 ASSESSMENT — DUKE ACTIVITY SCORE INDEX (DASI)
CAN YOU CLIMB A FLIGHT OF STAIRS OR WALK UP A HILL: YES
CAN YOU WALK INDOORS, SUCH AS AROUND YOUR HOUSE: YES
CAN YOU DO YARD WORK LIKE RAKING LEAVES, WEEDING OR PUSHING A MOWER: NO
CAN YOU WALK A BLOCK OR TWO ON LEVEL GROUND: YES
CAN YOU TAKE CARE OF YOURSELF (EAT, DRESS, BATHE, OR USE TOILET): YES
CAN YOU DO LIGHT WORK AROUND THE HOUSE LIKE DUSTING OR WASHING DISHES: YES
CAN YOU PARTICIPATE IN STRENOUS SPORTS LIKE SWIMMING, SINGLES TENNIS, FOOTBALL, BASKETBALL, OR SKIING: NO
CAN YOU DO HEAVY WORK AROUND THE HOUSE LIKE SCRUBBING FLOORS OR LIFTING AND MOVING HEAVY FURNITURE: YES
CAN YOU DO MODERATE WORK AROUND THE HOUSE LIKE VACUUMING, SWEEPING FLOORS OR CARRYING GROCERIES: YES
CAN YOU RUN A SHORT DISTANCE: NO
CAN YOU PARTICIPATE IN MODERATE RECREATIONAL ACTIVITIES LIKE GOLF, BOWLING, DANCING, DOUBLES TENNIS OR THROWING A BASEBALL OR FOOTBALL: NO

## 2025-07-14 NOTE — PREPROCEDURE INSTRUCTIONS
Medication List            Accurate as of July 14, 2025  1:40 PM. Always use your most recent med list.                ciprofloxacin 500 mg tablet  Commonly known as: Cipro  Take 1 pill the night before the procedure, 1 pill the morning of the procedure, and 1 pill the night of the procedure.  Medication Adjustments for Surgery: Take on the morning of surgery     metoprolol tartrate 25 mg tablet  Commonly known as: Lopressor  Take 1 tablet (25 mg) by mouth once daily.  Medication Adjustments for Surgery: Take on the morning of surgery     omeprazole 40 mg DR capsule  Commonly known as: PriLOSEC  TAKE 1 CAPSULE EVERY DAY OPEN AND TAKE W SUGAR FREE PUDDING APPLESAUCE  Medication Adjustments for Surgery: Do Not take on the morning of surgery     tadalafil 5 mg tablet  Commonly known as: Cialis  Take 1 tablet (5 mg) by mouth once daily.  Medication Adjustments for Surgery: Take last dose 3 days before surgery                        NPO Instructions:    **AFTER MIDNIGHT CLEAR LIQUIDS ONLY!  -ONLY WATER, BLACK COFFEE, TEA, CLEAR SODA**    PLEASE refrain from gum, candy, mints, and smoking in the morning.    STOP DRINKING 3 HOURS PRIOR TO PROCEDURE.    Additional Instructions:      Review your medication instructions, take indicated medications and STOP those when applicable.  Wear comfortable, loose fitting clothing.  Please remove ALL jewelry and body piercing's.   All valuables should be left at home.  Bring a glass case or container/solution for contacts.  Bring Photo ID and Insurance card.     Park in back of hospital by ER. Come up to Second floor-OUTPATIENT dept to check-in.    You MUST have an adult  with you. NO DRIVING FOR 24 HOURS AFTER SURGERY.     If you get ill at all the week before your procedure- CALL YOUR DOCTOR/SURGEON.  Any illness might lead to your procedure being delayed.    Please call us if you are started on any new medications before your procedure.      Call Outpatient dept at  [phone] between 1-3pm the day before your procedure (Friday for Monday procedure), for your arrival time.

## 2025-07-14 NOTE — ANESTHESIA PREPROCEDURE EVALUATION
Patient: Bao Toth    Procedure Information       Date/Time: 07/18/25 1230    Procedure: BIOPSY, PROSTATE, WITH IMAGING GUIDANCE    Location: [facility] / [facility]    Surgeons: Karla Hamilton MD            Relevant Problems   Anesthesia (within normal limits)      Cardiac   (+) Benign essential HTN   (+) Hypercholesteremia      Pulmonary (within normal limits)      Neuro   (+) Anxiety   (+) TIA (transient ischemic attack)      GI   (+) GERD (gastroesophageal reflux disease)      /Renal   (+) BPH (benign prostatic hyperplasia)      Liver (within normal limits)      Endocrine (within normal limits)      Hematology (within normal limits)      Musculoskeletal (within normal limits)      HEENT (within normal limits)      ID (within normal limits)      Skin (within normal limits)      GYN (within normal limits)       Clinical information reviewed:                   NPO Detail:  No data recorded     Physical Exam    Airway  Mallampati: II  TM distance: >3 FB  Neck ROM: full  Mouth opening: 3 or more finger widths     Cardiovascular - normal exam   Dental - normal exam     Pulmonary - normal exam   Abdominal - normal exam           Anesthesia Plan    History of general anesthesia?: yes  History of complications of general anesthesia?: unknown/emergency    ASA 3     MAC     The patient is a current smoker.  Patient was previously instructed to abstain from smoking on day of procedure.    intravenous induction   Anesthetic plan and risks discussed with patient.  Use of blood products discussed with patient who consented to blood products.

## 2025-07-16 LAB — BACTERIA UR CULT: NORMAL

## 2025-07-18 ENCOUNTER — ANESTHESIA (OUTPATIENT)
Dept: OPERATING ROOM | Facility: HOSPITAL | Age: 65
End: 2025-07-18
Payer: MEDICARE

## 2025-07-18 ENCOUNTER — HOSPITAL ENCOUNTER (OUTPATIENT)
Facility: HOSPITAL | Age: 65
Setting detail: OUTPATIENT SURGERY
Discharge: HOME | End: 2025-07-18
Attending: STUDENT IN AN ORGANIZED HEALTH CARE EDUCATION/TRAINING PROGRAM | Admitting: STUDENT IN AN ORGANIZED HEALTH CARE EDUCATION/TRAINING PROGRAM
Payer: MEDICARE

## 2025-07-18 VITALS
DIASTOLIC BLOOD PRESSURE: 80 MMHG | BODY MASS INDEX: 23.55 KG/M2 | SYSTOLIC BLOOD PRESSURE: 134 MMHG | OXYGEN SATURATION: 100 % | TEMPERATURE: 97.9 F | RESPIRATION RATE: 17 BRPM | HEIGHT: 69 IN | HEART RATE: 78 BPM | WEIGHT: 159 LBS

## 2025-07-18 DIAGNOSIS — R97.20 ELEVATED PSA: ICD-10-CM

## 2025-07-18 DIAGNOSIS — Z79.2 PROPHYLACTIC ANTIBIOTIC: Primary | ICD-10-CM

## 2025-07-18 PROCEDURE — G0416 PROSTATE BIOPSY, ANY MTHD: HCPCS | Mod: TC | Performed by: STUDENT IN AN ORGANIZED HEALTH CARE EDUCATION/TRAINING PROGRAM

## 2025-07-18 PROCEDURE — 2500000001 HC RX 250 WO HCPCS SELF ADMINISTERED DRUGS (ALT 637 FOR MEDICARE OP): Performed by: PHYSICIAN ASSISTANT

## 2025-07-18 PROCEDURE — 76942 ECHO GUIDE FOR BIOPSY: CPT | Performed by: STUDENT IN AN ORGANIZED HEALTH CARE EDUCATION/TRAINING PROGRAM

## 2025-07-18 PROCEDURE — 3600000009 HC OR TIME - EACH INCREMENTAL 1 MINUTE - PROCEDURE LEVEL FOUR: Performed by: STUDENT IN AN ORGANIZED HEALTH CARE EDUCATION/TRAINING PROGRAM

## 2025-07-18 PROCEDURE — 2720000007 HC OR 272 NO HCPCS: Performed by: STUDENT IN AN ORGANIZED HEALTH CARE EDUCATION/TRAINING PROGRAM

## 2025-07-18 PROCEDURE — 2500000004 HC RX 250 GENERAL PHARMACY W/ HCPCS (ALT 636 FOR OP/ED): Performed by: STUDENT IN AN ORGANIZED HEALTH CARE EDUCATION/TRAINING PROGRAM

## 2025-07-18 PROCEDURE — 3700000002 HC GENERAL ANESTHESIA TIME - EACH INCREMENTAL 1 MINUTE: Performed by: STUDENT IN AN ORGANIZED HEALTH CARE EDUCATION/TRAINING PROGRAM

## 2025-07-18 PROCEDURE — 3600000004 HC OR TIME - INITIAL BASE CHARGE - PROCEDURE LEVEL FOUR: Performed by: STUDENT IN AN ORGANIZED HEALTH CARE EDUCATION/TRAINING PROGRAM

## 2025-07-18 PROCEDURE — 7100000010 HC PHASE TWO TIME - EACH INCREMENTAL 1 MINUTE: Performed by: STUDENT IN AN ORGANIZED HEALTH CARE EDUCATION/TRAINING PROGRAM

## 2025-07-18 PROCEDURE — C1819 TISSUE LOCALIZATION-EXCISION: HCPCS | Performed by: STUDENT IN AN ORGANIZED HEALTH CARE EDUCATION/TRAINING PROGRAM

## 2025-07-18 PROCEDURE — 7100000009 HC PHASE TWO TIME - INITIAL BASE CHARGE: Performed by: STUDENT IN AN ORGANIZED HEALTH CARE EDUCATION/TRAINING PROGRAM

## 2025-07-18 PROCEDURE — 3700000001 HC GENERAL ANESTHESIA TIME - INITIAL BASE CHARGE: Performed by: STUDENT IN AN ORGANIZED HEALTH CARE EDUCATION/TRAINING PROGRAM

## 2025-07-18 PROCEDURE — 2500000004 HC RX 250 GENERAL PHARMACY W/ HCPCS (ALT 636 FOR OP/ED): Performed by: NURSE ANESTHETIST, CERTIFIED REGISTERED

## 2025-07-18 PROCEDURE — 55700 PR PROSTATE NEEDLE BIOPSY ANY APPROACH: CPT | Performed by: STUDENT IN AN ORGANIZED HEALTH CARE EDUCATION/TRAINING PROGRAM

## 2025-07-18 RX ORDER — OXYCODONE HYDROCHLORIDE 5 MG/1
5 TABLET ORAL EVERY 4 HOURS PRN
Status: DISCONTINUED | OUTPATIENT
Start: 2025-07-18 | End: 2025-07-18 | Stop reason: HOSPADM

## 2025-07-18 RX ORDER — ONDANSETRON HYDROCHLORIDE 2 MG/ML
4 INJECTION, SOLUTION INTRAVENOUS ONCE AS NEEDED
Status: DISCONTINUED | OUTPATIENT
Start: 2025-07-18 | End: 2025-07-18 | Stop reason: HOSPADM

## 2025-07-18 RX ORDER — LIDOCAINE HYDROCHLORIDE 10 MG/ML
INJECTION, SOLUTION INFILTRATION; PERINEURAL AS NEEDED
Status: DISCONTINUED | OUTPATIENT
Start: 2025-07-18 | End: 2025-07-18 | Stop reason: HOSPADM

## 2025-07-18 RX ORDER — SODIUM CHLORIDE, SODIUM LACTATE, POTASSIUM CHLORIDE, CALCIUM CHLORIDE 600; 310; 30; 20 MG/100ML; MG/100ML; MG/100ML; MG/100ML
100 INJECTION, SOLUTION INTRAVENOUS CONTINUOUS
Status: DISCONTINUED | OUTPATIENT
Start: 2025-07-18 | End: 2025-07-18 | Stop reason: HOSPADM

## 2025-07-18 RX ORDER — ACETAMINOPHEN 325 MG/1
650 TABLET ORAL EVERY 4 HOURS PRN
Status: DISCONTINUED | OUTPATIENT
Start: 2025-07-18 | End: 2025-07-18 | Stop reason: HOSPADM

## 2025-07-18 RX ORDER — GENTAMICIN SULFATE 80 MG/100ML
80 INJECTION, SOLUTION INTRAVENOUS ONCE
Status: DISCONTINUED | OUTPATIENT
Start: 2025-07-18 | End: 2025-07-18 | Stop reason: HOSPADM

## 2025-07-18 RX ORDER — OXYCODONE HYDROCHLORIDE 5 MG/1
10 TABLET ORAL EVERY 4 HOURS PRN
Status: DISCONTINUED | OUTPATIENT
Start: 2025-07-18 | End: 2025-07-18 | Stop reason: HOSPADM

## 2025-07-18 RX ORDER — GENTAMICIN 40 MG/ML
INJECTION, SOLUTION INTRAMUSCULAR; INTRAVENOUS AS NEEDED
Status: DISCONTINUED | OUTPATIENT
Start: 2025-07-18 | End: 2025-07-18

## 2025-07-18 RX ORDER — PROPOFOL 10 MG/ML
INJECTION, EMULSION INTRAVENOUS AS NEEDED
Status: DISCONTINUED | OUTPATIENT
Start: 2025-07-18 | End: 2025-07-18

## 2025-07-18 RX ADMIN — GENTAMICIN SULFATE 80 MG: 40 INJECTION, SOLUTION INTRAMUSCULAR; INTRAVENOUS at 12:12

## 2025-07-18 RX ADMIN — PROPOFOL 100 MG: 10 INJECTION, EMULSION INTRAVENOUS at 12:11

## 2025-07-18 RX ADMIN — ACETAMINOPHEN 650 MG: 325 TABLET, FILM COATED ORAL at 13:05

## 2025-07-18 RX ADMIN — PROPOFOL 50 MG: 10 INJECTION, EMULSION INTRAVENOUS at 12:22

## 2025-07-18 RX ADMIN — PROPOFOL 100 MCG/KG/MIN: 10 INJECTION, EMULSION INTRAVENOUS at 12:12

## 2025-07-18 ASSESSMENT — PAIN SCALES - GENERAL
PAIN_LEVEL: 0
PAINLEVEL_OUTOF10: 0 - NO PAIN
PAINLEVEL_OUTOF10: 3
PAINLEVEL_OUTOF10: 0 - NO PAIN
PAINLEVEL_OUTOF10: 3
PAINLEVEL_OUTOF10: 0 - NO PAIN
PAINLEVEL_OUTOF10: 0 - NO PAIN
PAINLEVEL_OUTOF10: 3
PAINLEVEL_OUTOF10: 0 - NO PAIN

## 2025-07-18 ASSESSMENT — PAIN - FUNCTIONAL ASSESSMENT
PAIN_FUNCTIONAL_ASSESSMENT: 0-10

## 2025-07-18 ASSESSMENT — COLUMBIA-SUICIDE SEVERITY RATING SCALE - C-SSRS
2. HAVE YOU ACTUALLY HAD ANY THOUGHTS OF KILLING YOURSELF?: NO
6. HAVE YOU EVER DONE ANYTHING, STARTED TO DO ANYTHING, OR PREPARED TO DO ANYTHING TO END YOUR LIFE?: NO
1. IN THE PAST MONTH, HAVE YOU WISHED YOU WERE DEAD OR WISHED YOU COULD GO TO SLEEP AND NOT WAKE UP?: NO

## 2025-07-18 ASSESSMENT — PAIN DESCRIPTION - DESCRIPTORS
DESCRIPTORS: ACHING;SORE

## 2025-07-18 NOTE — INTERVAL H&P NOTE
H&P reviewed. The patient was examined and there are no changes to the H&P.    No significant findings; reviewed medications and allergies; patient seen and discussed with attending physician responsible for performing the procedure.

## 2025-07-18 NOTE — OP NOTE
TRANSRECTAL ULTRASOUND GUIDED BIOPSY OF THE PROSTATE Operative Note     Date: 2025  OR Location: [facility]    Name: Bao Toth, : 1960, Age: 65 y.o., MRN: [id number], Sex: male    Diagnosis  Pre-op Diagnosis      * Elevated PSA [R97.20] Post-op Diagnosis     * Elevated PSA [R97.20]     Procedures  TRANSRECTAL ULTRASOUND GUIDED BIOPSY OF THE PROSTATE  79790 - AR PROSTATE NEEDLE BIOPSY ANY APPROACH    TRANSRECTAL ULTRASOUND GUIDED BIOPSY OF THE PROSTATE  12215 - CHG US GUIDANCE NEEDLE PLACEMENT IMG S&I      Surgeons      * Karla Hamilton - Primary    Resident/Fellow/Other Assistant:  Surgeons and Role:  * No surgeons found with a matching role *    Staff:   Corieulator: Renetta  Circulator: Ginna Sin Person: Gail    Anesthesia Staff: CRNA: SAMIRA Humphrey-CRNA    Procedure Summary  Anesthesia: Monitor Anesthesia Care  ASA: III  Estimated Blood Loss: 0 mL  Intra-op Medications:   Administrations occurring from 1150 to 1248 on 25:   Medication Name Total Dose   lidocaine (Xylocaine) 10 mg/mL (1 %) injection 10 mL   gentamicin (Garamycin) injection 40 mg/mL 80 mg   propofol (Diprivan) IV infusion 266.8 mg              Anesthesia Record               Intraprocedure I/O Totals          Intake    Propofol Drip 0.00 mL    The total shown is the total volume documented since Anesthesia Start was filed.    Total Intake 0 mL          Specimen:   ID Type Source Tests Collected by Time   1 : right base Tissue PROSTATE, BIOPSY, RIGHT BASE SURGICAL PATHOLOGY EXAM Karla Hamilton MD 2025 1224   2 : right mid Tissue PROSTATE, BIOPSY, RIGHT MID SURGICAL PATHOLOGY EXAM Karla Hamilton MD 2025 1224   3 : right apex Tissue PROSTATE, BIOPSY, RIGHT APEX SURGICAL PATHOLOGY EXAM Amritaed YISEL Hamilton MD 2025 1224   4 : left base Tissue PROSTATE, BIOPSY, LEFT BASE SURGICAL PATHOLOGY EXAM Amritaed YISEL Hamilton MD 2025 1225   5 : left mid Tissue PROSTATE, BIOPSY, LEFT MID SURGICAL  PATHOLOGY EXAM Karla Hamilton MD 7/18/2025 1225   6 : left apex Tissue PROSTATE, BIOPSY, LEFT APEX SURGICAL PATHOLOGY EXAM Karla Hamilton MD 7/18/2025 1225                 Drains and/or Catheters: * None in log *    Tourniquet Times:         Implants:     Findings: as per operative report    Indications: Bao Toth is an 65 y.o. male who is having surgery for Elevated PSA [R97.20]. MRI of the prostate was negative for suspicious lesion. A biopsy was attempted in clinic however the patient was very uncomfortable even before the start of the procedure.    The patient was seen in the preoperative area. The risks, benefits, complications, treatment options, non-operative alternatives, expected recovery and outcomes were discussed with the patient. The possibilities of reaction to medication, pulmonary aspiration, injury to surrounding structures, bleeding, recurrent infection, the need for additional procedures, failure to diagnose a condition, and creating a complication requiring transfusion or operation were discussed with the patient. The patient concurred with the proposed plan, giving informed consent.  The site of surgery was properly noted/marked if necessary per policy. The patient has been actively warmed in preoperative area. Preoperative antibiotics have been ordered and given within 1 hours of incision. Venous thrombosis prophylaxis are not indicated.    Procedure Details:    Patient was consent and antibiotics were administered on-call to the OR.  In the operating room, patient in lateral position, MAC sedation was initiated, the transrectal ultrasound probe was inserted, and ultrasound evaluation of the prostate was performed.  Local anesthetic was administered in Denonvillier's fascia.  12 systematic biopsies were obtained from the 6 sextants of the prostate.  Patient tolerated procedure well, there was minimal bleeding, and he was transferred to PACU in stable condition.    Evidence of  Infection: No   Complications:  None; patient tolerated the procedure well.    Disposition: PACU - hemodynamically stable.  Condition: stable       Additional Details:     Attending Attestation: I performed the procedure.    Karla Hamilton  Phone Number: [phone]

## 2025-07-18 NOTE — ANESTHESIA POSTPROCEDURE EVALUATION
Patient: Bao Toth    Procedure Summary       Date: 07/18/25 Room / Location: [facility] OR 01 / [facility]    Anesthesia Start: 1211 Anesthesia Stop:     Procedure: TRANSRECTAL ULTRASOUND GUIDED BIOPSY OF THE PROSTATE Diagnosis:       Elevated PSA      (Elevated PSA [R97.20])    Surgeons: Karla Hamilton MD Responsible Provider: LITZY Humphrey    Anesthesia Type: MAC ASA Status: 3            Anesthesia Type: MAC    Vitals Value Taken Time   /70 07/18/25 12:46   Temp 36.8 °C (98.2 °F) 07/18/25 12:31   Pulse 72 07/18/25 12:46   Resp 17 07/18/25 12:46   SpO2 99 % 07/18/25 12:46       Anesthesia Post Evaluation    Patient location during evaluation: PACU  Patient participation: complete - patient participated  Level of consciousness: awake  Pain score: 0  Pain management: adequate  Airway patency: patent  Cardiovascular status: acceptable  Respiratory status: acceptable  Hydration status: acceptable  Postoperative Nausea and Vomiting: none        No notable events documented.

## 2025-07-18 NOTE — DISCHARGE INSTRUCTIONS
Please contact Dr. Hamilton's office with any questions or concerns about your procedure today, [phone].

## 2025-07-22 ASSESSMENT — PAIN SCALES - GENERAL: PAINLEVEL_OUTOF10: 0 - NO PAIN

## 2025-07-24 ENCOUNTER — APPOINTMENT (OUTPATIENT)
Dept: PRIMARY CARE | Facility: CLINIC | Age: 65
End: 2025-07-24
Payer: MEDICARE

## 2025-07-24 VITALS
HEART RATE: 106 BPM | DIASTOLIC BLOOD PRESSURE: 80 MMHG | TEMPERATURE: 97.8 F | OXYGEN SATURATION: 100 % | WEIGHT: 157.8 LBS | BODY MASS INDEX: 23.3 KG/M2 | SYSTOLIC BLOOD PRESSURE: 120 MMHG

## 2025-07-24 DIAGNOSIS — E53.8 VITAMIN B12 DEFICIENCY: ICD-10-CM

## 2025-07-24 DIAGNOSIS — R10.13 EPIGASTRIC PAIN: ICD-10-CM

## 2025-07-24 DIAGNOSIS — R91.1 LUNG NODULE: ICD-10-CM

## 2025-07-24 DIAGNOSIS — J42 CHRONIC BRONCHITIS, UNSPECIFIED CHRONIC BRONCHITIS TYPE (MULTI): ICD-10-CM

## 2025-07-24 DIAGNOSIS — I10 BENIGN ESSENTIAL HTN: ICD-10-CM

## 2025-07-24 DIAGNOSIS — R97.20 ELEVATED PSA: ICD-10-CM

## 2025-07-24 DIAGNOSIS — R73.09 ABNORMAL BLOOD SUGAR: ICD-10-CM

## 2025-07-24 DIAGNOSIS — E78.00 HYPERCHOLESTEREMIA: ICD-10-CM

## 2025-07-24 DIAGNOSIS — R53.83 OTHER FATIGUE: ICD-10-CM

## 2025-07-24 DIAGNOSIS — F17.219 CIGARETTE NICOTINE DEPENDENCE WITH NICOTINE-INDUCED DISORDER: Primary | ICD-10-CM

## 2025-07-24 DIAGNOSIS — R07.9 CHEST PAIN, UNSPECIFIED TYPE: ICD-10-CM

## 2025-07-24 DIAGNOSIS — R97.20 HIGH PROSTATE SPECIFIC ANTIGEN (PSA): ICD-10-CM

## 2025-07-24 DIAGNOSIS — I10 HYPERTENSION, UNSPECIFIED TYPE: ICD-10-CM

## 2025-07-24 DIAGNOSIS — Z79.899 HIGH RISK MEDICATION USE: ICD-10-CM

## 2025-07-24 DIAGNOSIS — E55.9 VITAMIN D DEFICIENCY, UNSPECIFIED: ICD-10-CM

## 2025-07-24 DIAGNOSIS — K21.00 GASTROESOPHAGEAL REFLUX DISEASE WITH ESOPHAGITIS WITHOUT HEMORRHAGE: ICD-10-CM

## 2025-07-24 PROCEDURE — G2211 COMPLEX E/M VISIT ADD ON: HCPCS | Performed by: INTERNAL MEDICINE

## 2025-07-24 PROCEDURE — 1160F RVW MEDS BY RX/DR IN RCRD: CPT | Performed by: INTERNAL MEDICINE

## 2025-07-24 PROCEDURE — 3079F DIAST BP 80-89 MM HG: CPT | Performed by: INTERNAL MEDICINE

## 2025-07-24 PROCEDURE — 1159F MED LIST DOCD IN RCRD: CPT | Performed by: INTERNAL MEDICINE

## 2025-07-24 PROCEDURE — 3074F SYST BP LT 130 MM HG: CPT | Performed by: INTERNAL MEDICINE

## 2025-07-24 PROCEDURE — 99214 OFFICE O/P EST MOD 30 MIN: CPT | Performed by: INTERNAL MEDICINE

## 2025-07-24 RX ORDER — OMEPRAZOLE 40 MG/1
40 CAPSULE, DELAYED RELEASE ORAL
Qty: 90 CAPSULE | Refills: 1 | Status: SHIPPED | OUTPATIENT
Start: 2025-07-24

## 2025-07-24 ASSESSMENT — ENCOUNTER SYMPTOMS
ABDOMINAL PAIN: 0
DIFFICULTY URINATING: 0
FEVER: 0
PALPITATIONS: 0
FATIGUE: 0
DIZZINESS: 0
HYPERTENSION: 1
UNEXPECTED WEIGHT CHANGE: 0
COUGH: 0
SORE THROAT: 0
SINUS PAIN: 0
WHEEZING: 0
SHORTNESS OF BREATH: 1
BLOOD IN STOOL: 0
BRUISES/BLEEDS EASILY: 0
HEADACHES: 0
ARTHRALGIAS: 0
DIARRHEA: 0

## 2025-07-24 NOTE — PROGRESS NOTES
"Subjective   Patient ID: Bao Toth is a 65 y.o. male who presents for Hyperlipidemia and Hypertension.    -Recent blood work and plan of care reviewed  - Elevated PSA patient seen by urology had biopsy done awaiting results  -High cardiac calcium score occasional shortness of breath patient comes about the need for cardiac catheterization  Go to the emergency room with any chest pain patient is aware  -Schedule colonoscopy obtained in October 2024  Repeat colonoscopy in 7 years, due: 10/24/2031  - Low-dose CT scanning lung cancer screening obtained in November 2024 repeat in 1 year order for November 2025  - Nicotine dependency  \"I spent more 10 minutes counseling patient about need for smoking/tobacco cessation and how I can support efforts when patient is ready to quit.  Discussed nicotine replacement therapy, Varenicline, Bupropion, hypnosis, support groups, and accupunture as potential options.  Patient currently has no signs or symptoms of tobacco related disease.\".  -- Patient drinks beer daily counseled about alcohol abstinence  - Underlying groin hernia seen by general surgery patient need to quit smoking first patient wants to delay any surgical intervention will follow patient conservatively follow-up to the emergency room if any signs of obstruction  -- Suspected sleep apnea refer patient to sleep clinic patient declined  -Hypertension controlled continue with metoprolol no palpitations  -Vitamin D deficiency continue on vitamin D 5000 daily over-the-counter  --Chronic reflux disease pantoprazole  Follow-up 3 months with blood work and Medicare physical    Hyperlipidemia  Associated symptoms include shortness of breath. Pertinent negatives include no chest pain.   Hypertension  Associated symptoms include shortness of breath. Pertinent negatives include no chest pain, headaches or palpitations.          Review of Systems   Constitutional:  Negative for fatigue, fever and unexpected weight change. "   HENT:  Negative for congestion, ear discharge, ear pain, mouth sores, sinus pain and sore throat.    Eyes:  Negative for visual disturbance.   Respiratory:  Positive for shortness of breath. Negative for cough and wheezing.    Cardiovascular:  Negative for chest pain, palpitations and leg swelling.   Gastrointestinal:  Negative for abdominal pain, blood in stool and diarrhea.   Genitourinary:  Negative for difficulty urinating.   Musculoskeletal:  Negative for arthralgias.   Skin:  Negative for rash.   Neurological:  Negative for dizziness and headaches.   Hematological:  Does not bruise/bleed easily.   Psychiatric/Behavioral:  Negative for behavioral problems.    All other systems reviewed and are negative.      Objective   Lab Results   Component Value Date    HGBA1C 5.4 10/18/2024      /80   Pulse 106   Temp 36.6 °C (97.8 °F)   Wt 71.6 kg (157 lb 12.8 oz)   SpO2 100%   BMI 23.30 kg/m²   Lab Results   Component Value Date    WBC 10.5 10/18/2024    HGB 15.9 10/18/2024    HCT 47.2 10/18/2024     10/18/2024    CHOL 189 10/18/2024    TRIG 142 10/18/2024    HDL 60.0 10/18/2024    ALT 17 10/18/2024    AST 19 10/18/2024     (L) 07/14/2025    K 3.8 07/14/2025     07/14/2025    CREATININE 0.67 07/14/2025    BUN 4 (L) 07/14/2025    CO2 26 07/14/2025    TSH 2.23 10/18/2024    PSA 6.08 (H) 04/24/2025    INR 1.1 08/13/2018    HGBA1C 5.4 10/18/2024     par   Physical Exam  Vitals and nursing note reviewed.   Constitutional:       Appearance: Normal appearance.   HENT:      Head: Normocephalic.      Nose: Nose normal.     Eyes:      Conjunctiva/sclera: Conjunctivae normal.      Pupils: Pupils are equal, round, and reactive to light.       Cardiovascular:      Rate and Rhythm: Regular rhythm.   Pulmonary:      Effort: Pulmonary effort is normal.      Breath sounds: Normal breath sounds.   Abdominal:      General: Abdomen is flat.      Palpations: Abdomen is soft.     Musculoskeletal:      Cervical  back: Neck supple.     Skin:     General: Skin is warm.     Neurological:      General: No focal deficit present.      Mental Status: He is oriented to person, place, and time.     Psychiatric:         Mood and Affect: Mood normal.         Assessment/Plan   Bao was seen today for hyperlipidemia and hypertension.  Diagnoses and all orders for this visit:  Cigarette nicotine dependence with nicotine-induced disorder (Primary)  -     CT lung screening low dose; Future  Epigastric pain  -     omeprazole (PriLOSEC) 40 mg DR capsule; Take 1 capsule (40 mg) by mouth once daily in the morning. Take before meals. Do not crush or chew.  Benign essential HTN  Chest pain, unspecified type  High prostate specific antigen (PSA)  Lung nodule  -     CT lung screening low dose; Future  High risk medication use  -     CBC and Auto Differential; Future  -     CBC and Auto Differential  Hypertension, unspecified type  -     Comprehensive Metabolic Panel; Future  -     Comprehensive Metabolic Panel  Abnormal blood sugar  -     Hemoglobin A1C; Future  -     Hemoglobin A1C  Hypercholesteremia  -     Lipid Panel; Future  -     Lipid Panel  Other fatigue  -     TSH with reflex to Free T4 if abnormal; Future  -     TSH with reflex to Free T4 if abnormal  Vitamin B12 deficiency  -     Vitamin B12; Future  -     Vitamin B12  Vitamin D deficiency, unspecified  -     Vitamin D 25-Hydroxy,Total (for eval of Vitamin D levels); Future  -     Vitamin D 25-Hydroxy,Total (for eval of Vitamin D levels)  Gastroesophageal reflux disease with esophagitis without hemorrhage  Elevated PSA  Other orders  -     Follow Up In Primary Care - Established  -     Follow Up In Primary Care - Medicare Annual; Future   -Recent blood work and plan of care reviewed  - Elevated PSA patient seen by urology had biopsy done awaiting results  -High cardiac calcium score occasional shortness of breath patient comes about the need for cardiac catheterization  Go to the  "emergency room with any chest pain patient is aware  -Schedule colonoscopy obtained in October 2024  Repeat colonoscopy in 7 years, due: 10/24/2031  - Low-dose CT scanning lung cancer screening obtained in November 2024 repeat in 1 year order for November 2025  - Nicotine dependency  \"I spent more 10 minutes counseling patient about need for smoking/tobacco cessation and how I can support efforts when patient is ready to quit.  Discussed nicotine replacement therapy, Varenicline, Bupropion, hypnosis, support groups, and accupunture as potential options.  Patient currently has no signs or symptoms of tobacco related disease.\".  -- Patient drinks beer daily counseled about alcohol abstinence  - Underlying groin hernia seen by general surgery patient need to quit smoking first patient wants to delay any surgical intervention will follow patient conservatively follow-up to the emergency room if any signs of obstruction  -- Suspected sleep apnea refer patient to sleep clinic patient declined  -Hypertension controlled continue with metoprolol no palpitations  -Vitamin D deficiency continue on vitamin D 5000 daily over-the-counter  --Chronic reflux disease pantoprazole  Follow-up 3 months with blood work and Medicare physical  "

## 2025-07-25 LAB
LAB AP BLOCK FOR ADDITIONAL STUDIES: NORMAL
LABORATORY COMMENT REPORT: NORMAL
PATH REPORT.FINAL DX SPEC: NORMAL
PATH REPORT.GROSS SPEC: NORMAL
PATH REPORT.RELEVANT HX SPEC: NORMAL
PATH REPORT.TOTAL CANCER: NORMAL

## 2025-07-27 NOTE — PROGRESS NOTES
Subjective   Patient ID: Bao Toth is a 65 y.o. male.      HPI  65 y.o. male presents for a postoperative follow up visit. The patient underwent a fusion biopsy due to an elevated PSA level in the setting of a negative MRI. He is status post TRUS fusion biopsy of the prostate on 2025. He returns for the surgical pathology report.     The patient presented for a TRUS prostate biopsy on 2025. However he was unable to continue with the biopsies as the ultrasound probe being inserted into the rectum was unable to be tolerated due to a tight sphincter.    He is experiencing discomfort in the perineal area.     He continues to have occasional hematuria. When the hematuria occurs, it is a very small amount.     He is on an Aspirin 81 mg.      He states that his father  due to radiation therapy.     The patient has concerns related to his cardiac health and surgical intervention.     The patient is a former smoker, he is less than a pack a day smoker for 50 years. Several years ago, he states he has smoked a pack to a pack and a half of cigarettes.     Surgical Pathology Exam collected on 2025:   FINAL DIAGNOSIS  A. PROSTATE, BIOPSY, RIGHT BASE:   Focal high-grade prostatic intraepithelial neoplasia     B. PROSTATE, BIOPSY, RIGHT MID:   Atypical small acinar proliferation     C. PROSTATE, BIOPSY, RIGHT APEX:   Benign prostatic tissue     D. PROSTATE, BIOPSY, LEFT BASE:   Benign prostatic tissue     E. PROSTATE, BIOPSY, LEFT MID:   Prostatic adenocarcinoma, Martin score 7 (3+4), grade group 2, involving 1 of 2 cores, approximately 10% of submitted tissue     - Percentage of [place] pattern 4: 30%     Note: Focal cribriform pattern present.  No intraductal carcinoma identified.     F. PROSTATE, BIOPSY, LEFT APEX:   Prostatic adenocarcinoma, Martin score 6 (3+3), grade group 1, involving 1 of 2 cores, approximately 5% of submitted tissue     Note: No intraductal carcinoma identified.    Lab  Results   Component Value Date    PSA 6.08 (H) 04/24/2025    PSA 4.66 (H) 10/18/2024         Review of Systems  A complete review of systems was performed. All systems are noted to be negative unless indicated in the history of present illness, impression, active problem list, or past histories.     Objective   Physical Exam    Assessment/Plan   Diagnoses and all orders for this visit:  Malignant neoplasm of prostate (Multi)  Smoker      65 y.o. male presents for a postoperative follow up visit. The patient underwent a fusion biopsy due to an elevated PSA level in the setting of a negative MRI. He is status post TRUS fusion biopsy of the prostate on 07/18/2025. He returns for the surgical pathology report.     I personally reviewed the surgical pathology report from 07/18/2025. The results indicate Prostatic adenocarcinoma, Martin score 7 (3+4), grade group 2, involving 1 of 2 cores, approximately 10% of submitted tissue. Percentage of Martin pattern 4: 30%    I reviewed with the patient the pathology of the biopsy, Martin score 3+4=7, GG2 prostate cancer. I reviewed the grades and stages of prostate cancer, and the available options for treatment of prostate cancer, including active surveillance, radical surgery and radiation therapy. I went over the pros and cons of each line of treatment based on his grade of disease and preferences of short and long term side effects.    Active surveillance is typically reserved for low-risk GG1 prostate cancer as well as very low volume GG2 if patient is comfortable. This will include serial PSA's, physical exam including ISAIAH, serial MRI every 12-15 months or if PSA significantly rises, and repeat biopsies if indicated because of any worsening parameter.    For surgery, I explained that cancer would be excised as part of the radical prostatectomy, with or without nerve sparing depending on the anatomical properties of the cancer and the patient preference. The trifecta for  prostate cancer surgery includes cancer control, continence after surgery and sexual function. I explained that the recovery of continence after surgery depends on age, smoking status, and comorbidities particularly diabetes. I particularly explained the single-port prostatectomy, the access through a small 1 inch incision in the lower abdomen, avoiding the transperitoneal space which would result in faster recovery and less post-operative symptoms. Surgery as robotic-assisted radical prostatectomy will take about 4 hours, with an additional hour for robot docking and wound closures, requires 1 night of hospital stay with a drain, and a standard of 7-8 days of Horton catheter. Recovery and return to full physical activity takes 6 weeks. Urinary incontinence typically recovers within few weeks to months, with 90% achieving continence within 3 to 6 months.    Radiation therapy includes external beam radiation therapy and brachytherapy, and typically requires ADT as a radiosensitizer, either for 6 months for intermediate-risk disease, or 2 years for high-risk disease. I reviewed the side effects of ADT, including hot flushes, fatigue, worsening cardiovascular and skeletal disease. Radiation in turn could result in urinary symptoms because of irritation of the bladder neck and trigone, decline of sexual function both during ADT as well as progressively over time.    I have advised the patient that I would consider treatment within the next 6 months to 1 year. My recommendation for treatment would be for surgical intervention. The patient is healthy and young, which will assist with his post operative symptoms.     Patient verbalized understanding of his options and elected to go with considering his options.     I have counseled the patient regarding the benefits of smoking cessation pertinent to his cardiac, respiratory health, as well as the symptoms associated with treatment of prostate cancer.    Plan:  Smoking  cessation counseling  Consider treatment for prostate cancer, RALP versus radiation therapy  FUV with urology after he decides        Scribe Attestation   By signing my name below, I, Eb Dewitt, Diegoibe attestation that this documentation has been prepared under the direction and in the presence of Karla Hamilton MD.

## 2025-07-28 ENCOUNTER — HOSPITAL ENCOUNTER (OUTPATIENT)
Dept: RESPIRATORY THERAPY | Facility: HOSPITAL | Age: 65
Discharge: HOME | End: 2025-07-28
Payer: MEDICARE

## 2025-07-28 DIAGNOSIS — J44.9 CHRONIC OBSTRUCTIVE PULMONARY DISEASE, UNSPECIFIED COPD TYPE (MULTI): ICD-10-CM

## 2025-07-28 DIAGNOSIS — R06.02 SHORTNESS OF BREATH: ICD-10-CM

## 2025-07-28 LAB
MGC ASCENT PFT - FEV1 - POST: 2.74
MGC ASCENT PFT - FEV1 - PRE: 2.57
MGC ASCENT PFT - FEV1 - PREDICTED: 3.04
MGC ASCENT PFT - FVC - POST: 4.12
MGC ASCENT PFT - FVC - PRE: 4.1
MGC ASCENT PFT - FVC - PREDICTED: 3.97

## 2025-07-28 PROCEDURE — 94060 EVALUATION OF WHEEZING: CPT

## 2025-07-28 PROCEDURE — 94729 DIFFUSING CAPACITY: CPT

## 2025-07-28 PROCEDURE — 94726 PLETHYSMOGRAPHY LUNG VOLUMES: CPT

## 2025-07-28 PROCEDURE — 94729 DIFFUSING CAPACITY: CPT | Performed by: PEDIATRICS

## 2025-07-28 PROCEDURE — 94760 N-INVAS EAR/PLS OXIMETRY 1: CPT

## 2025-07-28 PROCEDURE — 94060 EVALUATION OF WHEEZING: CPT | Performed by: PEDIATRICS

## 2025-07-28 PROCEDURE — 94640 AIRWAY INHALATION TREATMENT: CPT | Mod: 59

## 2025-07-28 PROCEDURE — 94726 PLETHYSMOGRAPHY LUNG VOLUMES: CPT | Performed by: PEDIATRICS

## 2025-07-28 PROCEDURE — 94664 DEMO&/EVAL PT USE INHALER: CPT | Mod: 59

## 2025-07-28 PROCEDURE — 2500000002 HC RX 250 W HCPCS SELF ADMINISTERED DRUGS (ALT 637 FOR MEDICARE OP, ALT 636 FOR OP/ED): Performed by: INTERNAL MEDICINE

## 2025-07-28 RX ORDER — ALBUTEROL SULFATE 90 UG/1
1 INHALANT RESPIRATORY (INHALATION) ONCE
Status: COMPLETED | OUTPATIENT
Start: 2025-07-28 | End: 2025-07-28

## 2025-07-28 RX ORDER — ALBUTEROL SULFATE 0.83 MG/ML
3 SOLUTION RESPIRATORY (INHALATION) ONCE
Status: COMPLETED | OUTPATIENT
Start: 2025-07-28 | End: 2025-07-28

## 2025-07-28 RX ADMIN — ALBUTEROL SULFATE 3 ML: 2.5 SOLUTION RESPIRATORY (INHALATION) at 11:08

## 2025-07-29 LAB
ATRIAL RATE: 79 BPM
P AXIS: 42 DEGREES
P OFFSET: 201 MS
P ONSET: 147 MS
PR INTERVAL: 144 MS
Q ONSET: 219 MS
QRS COUNT: 13 BEATS
QRS DURATION: 92 MS
QT INTERVAL: 362 MS
QTC CALCULATION(BAZETT): 415 MS
QTC FREDERICIA: 396 MS
R AXIS: -6 DEGREES
T AXIS: 14 DEGREES
T OFFSET: 400 MS
VENTRICULAR RATE: 79 BPM

## 2025-07-30 ENCOUNTER — APPOINTMENT (OUTPATIENT)
Dept: UROLOGY | Facility: CLINIC | Age: 65
End: 2025-07-30
Payer: MEDICARE

## 2025-07-30 DIAGNOSIS — F17.200 SMOKER: ICD-10-CM

## 2025-07-30 DIAGNOSIS — C61 MALIGNANT NEOPLASM OF PROSTATE (MULTI): Primary | ICD-10-CM

## 2025-07-30 PROCEDURE — 99406 BEHAV CHNG SMOKING 3-10 MIN: CPT | Performed by: STUDENT IN AN ORGANIZED HEALTH CARE EDUCATION/TRAINING PROGRAM

## 2025-07-30 PROCEDURE — 99214 OFFICE O/P EST MOD 30 MIN: CPT | Performed by: STUDENT IN AN ORGANIZED HEALTH CARE EDUCATION/TRAINING PROGRAM

## 2025-07-30 PROCEDURE — G2211 COMPLEX E/M VISIT ADD ON: HCPCS | Performed by: STUDENT IN AN ORGANIZED HEALTH CARE EDUCATION/TRAINING PROGRAM

## 2025-08-11 ENCOUNTER — HOSPITAL ENCOUNTER (EMERGENCY)
Facility: HOSPITAL | Age: 65
Discharge: ANOTHER HOSPITAL | End: 2025-08-11
Attending: EMERGENCY MEDICINE
Payer: MEDICARE

## 2025-08-11 ENCOUNTER — APPOINTMENT (OUTPATIENT)
Dept: CARDIOLOGY | Facility: HOSPITAL | Age: 65
End: 2025-08-11
Payer: MEDICARE

## 2025-08-11 ENCOUNTER — HOSPITAL ENCOUNTER (INPATIENT)
Facility: HOSPITAL | Age: 65
LOS: 2 days | Discharge: HOME | DRG: 287 | End: 2025-08-13
Attending: STUDENT IN AN ORGANIZED HEALTH CARE EDUCATION/TRAINING PROGRAM | Admitting: INTERNAL MEDICINE
Payer: MEDICARE

## 2025-08-11 ENCOUNTER — APPOINTMENT (OUTPATIENT)
Dept: RADIOLOGY | Facility: HOSPITAL | Age: 65
End: 2025-08-11
Payer: MEDICARE

## 2025-08-11 VITALS
TEMPERATURE: 97.4 F | SYSTOLIC BLOOD PRESSURE: 118 MMHG | WEIGHT: 165 LBS | DIASTOLIC BLOOD PRESSURE: 72 MMHG | HEART RATE: 84 BPM | HEIGHT: 69 IN | OXYGEN SATURATION: 97 % | RESPIRATION RATE: 14 BRPM | BODY MASS INDEX: 24.44 KG/M2

## 2025-08-11 DIAGNOSIS — Z87.09 HISTORY OF REACTIVE AIRWAY DISEASE: ICD-10-CM

## 2025-08-11 DIAGNOSIS — I25.110 ATHEROSCLEROTIC HEART DISEASE OF NATIVE CORONARY ARTERY WITH UNSTABLE ANGINA PECTORIS: ICD-10-CM

## 2025-08-11 DIAGNOSIS — E78.00 HYPERCHOLESTEREMIA: ICD-10-CM

## 2025-08-11 DIAGNOSIS — I10 PRIMARY HYPERTENSION: ICD-10-CM

## 2025-08-11 DIAGNOSIS — R07.9 CHEST PAIN, UNSPECIFIED TYPE: Primary | ICD-10-CM

## 2025-08-11 DIAGNOSIS — I20.0 UNSTABLE ANGINA (MULTI): Primary | ICD-10-CM

## 2025-08-11 LAB
ALBUMIN SERPL BCP-MCNC: 4.6 G/DL (ref 3.4–5)
ALP SERPL-CCNC: 57 U/L (ref 33–136)
ALT SERPL W P-5'-P-CCNC: 22 U/L (ref 10–52)
ANION GAP SERPL CALC-SCNC: 15 MMOL/L (ref 10–20)
AST SERPL W P-5'-P-CCNC: 22 U/L (ref 9–39)
BASOPHILS # BLD AUTO: 0.08 X10*3/UL (ref 0–0.1)
BASOPHILS NFR BLD AUTO: 0.8 %
BILIRUB SERPL-MCNC: 0.8 MG/DL (ref 0–1.2)
BUN SERPL-MCNC: 6 MG/DL (ref 6–23)
CALCIUM SERPL-MCNC: 9.2 MG/DL (ref 8.6–10.3)
CARDIAC TROPONIN I PNL SERPL HS: 3 NG/L (ref 0–20)
CARDIAC TROPONIN I PNL SERPL HS: 3 NG/L (ref 0–20)
CHLORIDE SERPL-SCNC: 100 MMOL/L (ref 98–107)
CO2 SERPL-SCNC: 24 MMOL/L (ref 21–32)
CREAT SERPL-MCNC: 0.65 MG/DL (ref 0.5–1.3)
EGFRCR SERPLBLD CKD-EPI 2021: >90 ML/MIN/1.73M*2
EOSINOPHIL # BLD AUTO: 0.38 X10*3/UL (ref 0–0.7)
EOSINOPHIL NFR BLD AUTO: 3.7 %
ERYTHROCYTE [DISTWIDTH] IN BLOOD BY AUTOMATED COUNT: 12.8 % (ref 11.5–14.5)
GLUCOSE SERPL-MCNC: 112 MG/DL (ref 74–99)
HCT VFR BLD AUTO: 44.6 % (ref 41–52)
HGB BLD-MCNC: 15.5 G/DL (ref 13.5–17.5)
IMM GRANULOCYTES # BLD AUTO: 0.04 X10*3/UL (ref 0–0.7)
IMM GRANULOCYTES NFR BLD AUTO: 0.4 % (ref 0–0.9)
LYMPHOCYTES # BLD AUTO: 2.8 X10*3/UL (ref 1.2–4.8)
LYMPHOCYTES NFR BLD AUTO: 26.9 %
MAGNESIUM SERPL-MCNC: 1.82 MG/DL (ref 1.6–2.4)
MCH RBC QN AUTO: 31.9 PG (ref 26–34)
MCHC RBC AUTO-ENTMCNC: 34.8 G/DL (ref 32–36)
MCV RBC AUTO: 92 FL (ref 80–100)
MONOCYTES # BLD AUTO: 0.86 X10*3/UL (ref 0.1–1)
MONOCYTES NFR BLD AUTO: 8.3 %
NEUTROPHILS # BLD AUTO: 6.24 X10*3/UL (ref 1.2–7.7)
NEUTROPHILS NFR BLD AUTO: 59.9 %
NRBC BLD-RTO: 0 /100 WBCS (ref 0–0)
PLATELET # BLD AUTO: 251 X10*3/UL (ref 150–450)
POTASSIUM SERPL-SCNC: 3.7 MMOL/L (ref 3.5–5.3)
PROT SERPL-MCNC: 6.9 G/DL (ref 6.4–8.2)
RBC # BLD AUTO: 4.86 X10*6/UL (ref 4.5–5.9)
SODIUM SERPL-SCNC: 135 MMOL/L (ref 136–145)
WBC # BLD AUTO: 10.4 X10*3/UL (ref 4.4–11.3)

## 2025-08-11 PROCEDURE — 99223 1ST HOSP IP/OBS HIGH 75: CPT | Performed by: INTERNAL MEDICINE

## 2025-08-11 PROCEDURE — 84484 ASSAY OF TROPONIN QUANT: CPT | Performed by: EMERGENCY MEDICINE

## 2025-08-11 PROCEDURE — 4A023N7 MEASUREMENT OF CARDIAC SAMPLING AND PRESSURE, LEFT HEART, PERCUTANEOUS APPROACH: ICD-10-PCS | Performed by: INTERNAL MEDICINE

## 2025-08-11 PROCEDURE — 85025 COMPLETE CBC W/AUTO DIFF WBC: CPT | Performed by: EMERGENCY MEDICINE

## 2025-08-11 PROCEDURE — 93005 ELECTROCARDIOGRAM TRACING: CPT

## 2025-08-11 PROCEDURE — 36415 COLL VENOUS BLD VENIPUNCTURE: CPT | Performed by: EMERGENCY MEDICINE

## 2025-08-11 PROCEDURE — G0378 HOSPITAL OBSERVATION PER HR: HCPCS

## 2025-08-11 PROCEDURE — 2500000001 HC RX 250 WO HCPCS SELF ADMINISTERED DRUGS (ALT 637 FOR MEDICARE OP)

## 2025-08-11 PROCEDURE — 71045 X-RAY EXAM CHEST 1 VIEW: CPT

## 2025-08-11 PROCEDURE — 84075 ASSAY ALKALINE PHOSPHATASE: CPT | Performed by: EMERGENCY MEDICINE

## 2025-08-11 PROCEDURE — B2111ZZ FLUOROSCOPY OF MULTIPLE CORONARY ARTERIES USING LOW OSMOLAR CONTRAST: ICD-10-PCS | Performed by: INTERNAL MEDICINE

## 2025-08-11 PROCEDURE — 99285 EMERGENCY DEPT VISIT HI MDM: CPT | Mod: 25 | Performed by: EMERGENCY MEDICINE

## 2025-08-11 PROCEDURE — 83735 ASSAY OF MAGNESIUM: CPT | Performed by: EMERGENCY MEDICINE

## 2025-08-11 PROCEDURE — 2500000001 HC RX 250 WO HCPCS SELF ADMINISTERED DRUGS (ALT 637 FOR MEDICARE OP): Performed by: PHYSICIAN ASSISTANT

## 2025-08-11 PROCEDURE — 1200000002 HC GENERAL ROOM WITH TELEMETRY DAILY

## 2025-08-11 PROCEDURE — 71045 X-RAY EXAM CHEST 1 VIEW: CPT | Mod: FOREIGN READ | Performed by: RADIOLOGY

## 2025-08-11 RX ORDER — ACETAMINOPHEN 650 MG/1
650 SUPPOSITORY RECTAL EVERY 4 HOURS PRN
Status: DISCONTINUED | OUTPATIENT
Start: 2025-08-11 | End: 2025-08-13 | Stop reason: HOSPADM

## 2025-08-11 RX ORDER — CHOLECALCIFEROL (VITAMIN D3) 25 MCG
25 TABLET ORAL DAILY
Status: DISCONTINUED | OUTPATIENT
Start: 2025-08-12 | End: 2025-08-13 | Stop reason: HOSPADM

## 2025-08-11 RX ORDER — ASPIRIN 81 MG/1
81 TABLET ORAL DAILY
COMMUNITY

## 2025-08-11 RX ORDER — DOCUSATE SODIUM 100 MG/1
100 CAPSULE, LIQUID FILLED ORAL 2 TIMES DAILY
Status: DISCONTINUED | OUTPATIENT
Start: 2025-08-11 | End: 2025-08-13 | Stop reason: HOSPADM

## 2025-08-11 RX ORDER — ASPIRIN 81 MG/1
81 TABLET ORAL DAILY
Status: DISCONTINUED | OUTPATIENT
Start: 2025-08-12 | End: 2025-08-13 | Stop reason: HOSPADM

## 2025-08-11 RX ORDER — ACETAMINOPHEN 160 MG/5ML
650 SOLUTION ORAL EVERY 4 HOURS PRN
Status: DISCONTINUED | OUTPATIENT
Start: 2025-08-11 | End: 2025-08-13 | Stop reason: HOSPADM

## 2025-08-11 RX ORDER — METOPROLOL TARTRATE 25 MG/1
25 TABLET, FILM COATED ORAL DAILY
Status: DISCONTINUED | OUTPATIENT
Start: 2025-08-12 | End: 2025-08-13 | Stop reason: HOSPADM

## 2025-08-11 RX ORDER — CHOLECALCIFEROL (VITAMIN D3) 25 MCG
25 TABLET ORAL DAILY
COMMUNITY

## 2025-08-11 RX ORDER — MORPHINE SULFATE 2 MG/ML
2 INJECTION, SOLUTION INTRAMUSCULAR; INTRAVENOUS EVERY 5 MIN PRN
Status: DISCONTINUED | OUTPATIENT
Start: 2025-08-11 | End: 2025-08-13 | Stop reason: HOSPADM

## 2025-08-11 RX ORDER — ACETAMINOPHEN 325 MG/1
TABLET ORAL
Status: COMPLETED
Start: 2025-08-11 | End: 2025-08-11

## 2025-08-11 RX ORDER — ACETAMINOPHEN 325 MG/1
650 TABLET ORAL EVERY 4 HOURS PRN
Status: DISCONTINUED | OUTPATIENT
Start: 2025-08-11 | End: 2025-08-13 | Stop reason: HOSPADM

## 2025-08-11 RX ORDER — CLOPIDOGREL BISULFATE 300 MG/1
300 TABLET, FILM COATED ORAL ONCE
Status: COMPLETED | OUTPATIENT
Start: 2025-08-11 | End: 2025-08-11

## 2025-08-11 RX ORDER — ACETAMINOPHEN 325 MG/1
650 TABLET ORAL ONCE
Status: COMPLETED | OUTPATIENT
Start: 2025-08-11 | End: 2025-08-11

## 2025-08-11 RX ORDER — PANTOPRAZOLE SODIUM 40 MG/1
40 TABLET, DELAYED RELEASE ORAL
Status: DISCONTINUED | OUTPATIENT
Start: 2025-08-12 | End: 2025-08-13 | Stop reason: HOSPADM

## 2025-08-11 RX ORDER — ATORVASTATIN CALCIUM 40 MG/1
40 TABLET, FILM COATED ORAL
Status: DISCONTINUED | OUTPATIENT
Start: 2025-08-12 | End: 2025-08-13 | Stop reason: HOSPADM

## 2025-08-11 RX ORDER — NAPROXEN SODIUM 220 MG/1
324 TABLET, FILM COATED ORAL ONCE
Status: COMPLETED | OUTPATIENT
Start: 2025-08-11 | End: 2025-08-11

## 2025-08-11 RX ORDER — ENOXAPARIN SODIUM 100 MG/ML
40 INJECTION SUBCUTANEOUS EVERY 24 HOURS
Status: DISCONTINUED | OUTPATIENT
Start: 2025-08-11 | End: 2025-08-13 | Stop reason: HOSPADM

## 2025-08-11 RX ORDER — BISMUTH SUBSALICYLATE 262 MG
TABLET,CHEWABLE ORAL DAILY
COMMUNITY

## 2025-08-11 RX ADMIN — CLOPIDOGREL BISULFATE 300 MG: 300 TABLET, FILM COATED ORAL at 14:21

## 2025-08-11 RX ADMIN — ACETAMINOPHEN 650 MG: 325 TABLET ORAL at 16:27

## 2025-08-11 RX ADMIN — ASPIRIN 81 MG CHEWABLE TABLET 324 MG: 81 TABLET CHEWABLE at 12:01

## 2025-08-11 RX ADMIN — ACETAMINOPHEN 650 MG: 325 TABLET, FILM COATED ORAL at 16:27

## 2025-08-11 SDOH — SOCIAL STABILITY: SOCIAL INSECURITY: WITHIN THE LAST YEAR, HAVE YOU BEEN HUMILIATED OR EMOTIONALLY ABUSED IN OTHER WAYS BY YOUR PARTNER OR EX-PARTNER?: NO

## 2025-08-11 SDOH — SOCIAL STABILITY: SOCIAL INSECURITY
WITHIN THE LAST YEAR, HAVE YOU BEEN KICKED, HIT, SLAPPED, OR OTHERWISE PHYSICALLY HURT BY YOUR PARTNER OR EX-PARTNER?: NO

## 2025-08-11 SDOH — SOCIAL STABILITY: SOCIAL INSECURITY: WITHIN THE LAST YEAR, HAVE YOU BEEN AFRAID OF YOUR PARTNER OR EX-PARTNER?: NO

## 2025-08-11 SDOH — ECONOMIC STABILITY: FOOD INSECURITY: WITHIN THE PAST 12 MONTHS, YOU WORRIED THAT YOUR FOOD WOULD RUN OUT BEFORE YOU GOT THE MONEY TO BUY MORE.: NEVER TRUE

## 2025-08-11 SDOH — SOCIAL STABILITY: SOCIAL INSECURITY
WITHIN THE LAST YEAR, HAVE YOU BEEN RAPED OR FORCED TO HAVE ANY KIND OF SEXUAL ACTIVITY BY YOUR PARTNER OR EX-PARTNER?: NO

## 2025-08-11 SDOH — ECONOMIC STABILITY: FOOD INSECURITY: WITHIN THE PAST 12 MONTHS, THE FOOD YOU BOUGHT JUST DIDN'T LAST AND YOU DIDN'T HAVE MONEY TO GET MORE.: NEVER TRUE

## 2025-08-11 SDOH — SOCIAL STABILITY: SOCIAL INSECURITY: HAS ANYONE EVER THREATENED TO HURT YOUR FAMILY OR YOUR PETS?: NO

## 2025-08-11 SDOH — ECONOMIC STABILITY: INCOME INSECURITY: IN THE PAST 12 MONTHS HAS THE ELECTRIC, GAS, OIL, OR WATER COMPANY THREATENED TO SHUT OFF SERVICES IN YOUR HOME?: NO

## 2025-08-11 SDOH — SOCIAL STABILITY: SOCIAL INSECURITY: DO YOU FEEL ANYONE HAS EXPLOITED OR TAKEN ADVANTAGE OF YOU FINANCIALLY OR OF YOUR PERSONAL PROPERTY?: NO

## 2025-08-11 SDOH — SOCIAL STABILITY: SOCIAL INSECURITY: ARE THERE ANY APPARENT SIGNS OF INJURIES/BEHAVIORS THAT COULD BE RELATED TO ABUSE/NEGLECT?: NO

## 2025-08-11 SDOH — SOCIAL STABILITY: SOCIAL INSECURITY: DOES ANYONE TRY TO KEEP YOU FROM HAVING/CONTACTING OTHER FRIENDS OR DOING THINGS OUTSIDE YOUR HOME?: NO

## 2025-08-11 SDOH — SOCIAL STABILITY: SOCIAL INSECURITY: ARE YOU OR HAVE YOU BEEN THREATENED OR ABUSED PHYSICALLY, EMOTIONALLY, OR SEXUALLY BY ANYONE?: NO

## 2025-08-11 SDOH — SOCIAL STABILITY: SOCIAL INSECURITY: ABUSE: ADULT

## 2025-08-11 SDOH — SOCIAL STABILITY: SOCIAL INSECURITY: HAVE YOU HAD ANY THOUGHTS OF HARMING ANYONE ELSE?: NO

## 2025-08-11 SDOH — SOCIAL STABILITY: SOCIAL INSECURITY: DO YOU FEEL UNSAFE GOING BACK TO THE PLACE WHERE YOU ARE LIVING?: NO

## 2025-08-11 SDOH — SOCIAL STABILITY: SOCIAL INSECURITY: HAVE YOU HAD THOUGHTS OF HARMING ANYONE ELSE?: NO

## 2025-08-11 SDOH — SOCIAL STABILITY: SOCIAL INSECURITY: WERE YOU ABLE TO COMPLETE ALL THE BEHAVIORAL HEALTH SCREENINGS?: YES

## 2025-08-11 ASSESSMENT — COGNITIVE AND FUNCTIONAL STATUS - GENERAL
DAILY ACTIVITIY SCORE: 24
PATIENT BASELINE BEDBOUND: NO
MOBILITY SCORE: 23
CLIMB 3 TO 5 STEPS WITH RAILING: A LITTLE

## 2025-08-11 ASSESSMENT — ACTIVITIES OF DAILY LIVING (ADL)
HEARING - LEFT EAR: DIFFICULTY WITH NOISE
DRESSING YOURSELF: INDEPENDENT
GROOMING: INDEPENDENT
TOILETING: INDEPENDENT
ADEQUATE_TO_COMPLETE_ADL: YES
WALKS IN HOME: INDEPENDENT
PATIENT'S MEMORY ADEQUATE TO SAFELY COMPLETE DAILY ACTIVITIES?: YES
LACK_OF_TRANSPORTATION: NO
HEARING - RIGHT EAR: DIFFICULTY WITH NOISE
BATHING: INDEPENDENT
FEEDING YOURSELF: INDEPENDENT
JUDGMENT_ADEQUATE_SAFELY_COMPLETE_DAILY_ACTIVITIES: YES

## 2025-08-11 ASSESSMENT — PAIN SCALES - GENERAL
PAINLEVEL_OUTOF10: 0 - NO PAIN

## 2025-08-11 ASSESSMENT — LIFESTYLE VARIABLES
SKIP TO QUESTIONS 9-10: 0
AUDIT-C TOTAL SCORE: 5
HOW OFTEN DO YOU HAVE 6 OR MORE DRINKS ON ONE OCCASION: NEVER
AUDIT-C TOTAL SCORE: 5
HOW MANY STANDARD DRINKS CONTAINING ALCOHOL DO YOU HAVE ON A TYPICAL DAY: 3 OR 4
HOW OFTEN DO YOU HAVE A DRINK CONTAINING ALCOHOL: 4 OR MORE TIMES A WEEK

## 2025-08-11 ASSESSMENT — PAIN - FUNCTIONAL ASSESSMENT
PAIN_FUNCTIONAL_ASSESSMENT: 0-10

## 2025-08-11 ASSESSMENT — PATIENT HEALTH QUESTIONNAIRE - PHQ9
2. FEELING DOWN, DEPRESSED OR HOPELESS: NOT AT ALL
1. LITTLE INTEREST OR PLEASURE IN DOING THINGS: NOT AT ALL
SUM OF ALL RESPONSES TO PHQ9 QUESTIONS 1 & 2: 0

## 2025-08-12 ENCOUNTER — APPOINTMENT (OUTPATIENT)
Dept: CARDIOLOGY | Facility: HOSPITAL | Age: 65
DRG: 287 | End: 2025-08-12
Payer: MEDICARE

## 2025-08-12 ENCOUNTER — APPOINTMENT (OUTPATIENT)
Dept: RADIOLOGY | Facility: HOSPITAL | Age: 65
DRG: 287 | End: 2025-08-12
Payer: MEDICARE

## 2025-08-12 LAB
ANION GAP SERPL CALC-SCNC: 8 MMOL/L (ref 10–20)
AORTIC VALVE MEAN GRADIENT: 4 MMHG
AORTIC VALVE PEAK VELOCITY: 1.33 M/S
ATRIAL RATE: 117 BPM
ATRIAL RATE: 88 BPM
AV PEAK GRADIENT: 7 MMHG
AVA (PEAK VEL): 1.95 CM2
AVA (VTI): 1.97 CM2
BUN SERPL-MCNC: 8 MG/DL (ref 6–23)
CALCIUM SERPL-MCNC: 8.6 MG/DL (ref 8.6–10.3)
CHLORIDE SERPL-SCNC: 105 MMOL/L (ref 98–107)
CHOLEST SERPL-MCNC: 148 MG/DL (ref 0–199)
CHOLESTEROL/HDL RATIO: 3
CO2 SERPL-SCNC: 28 MMOL/L (ref 21–32)
CREAT SERPL-MCNC: 0.61 MG/DL (ref 0.5–1.3)
D DIMER PPP FEU-MCNC: 217 NG/ML FEU
EGFRCR SERPLBLD CKD-EPI 2021: >90 ML/MIN/1.73M*2
EJECTION FRACTION APICAL 4 CHAMBER: 52.2
EJECTION FRACTION: 55 %
ERYTHROCYTE [DISTWIDTH] IN BLOOD BY AUTOMATED COUNT: 12.9 % (ref 11.5–14.5)
GLUCOSE SERPL-MCNC: 86 MG/DL (ref 74–99)
HCT VFR BLD AUTO: 41.3 % (ref 41–52)
HDLC SERPL-MCNC: 49.5 MG/DL
HGB BLD-MCNC: 14 G/DL (ref 13.5–17.5)
LDLC SERPL CALC-MCNC: 70 MG/DL
LEFT ATRIUM VOLUME AREA LENGTH INDEX BSA: 15.2 ML/M2
LEFT VENTRICLE INTERNAL DIMENSION DIASTOLE: 4.42 CM (ref 3.5–6)
LEFT VENTRICULAR OUTFLOW TRACT DIAMETER: 2.05 CM
MCH RBC QN AUTO: 32.1 PG (ref 26–34)
MCHC RBC AUTO-ENTMCNC: 33.9 G/DL (ref 32–36)
MCV RBC AUTO: 95 FL (ref 80–100)
MITRAL VALVE E/A RATIO: 1
NON HDL CHOLESTEROL: 99 MG/DL (ref 0–149)
NRBC BLD-RTO: 0 /100 WBCS (ref 0–0)
P AXIS: 43 DEGREES
P AXIS: 74 DEGREES
P OFFSET: 201 MS
P OFFSET: 207 MS
P ONSET: 153 MS
P ONSET: 153 MS
PLATELET # BLD AUTO: 202 X10*3/UL (ref 150–450)
POTASSIUM SERPL-SCNC: 3.9 MMOL/L (ref 3.5–5.3)
PR INTERVAL: 140 MS
PR INTERVAL: 140 MS
Q ONSET: 223 MS
Q ONSET: 223 MS
QRS COUNT: 15 BEATS
QRS COUNT: 19 BEATS
QRS DURATION: 84 MS
QRS DURATION: 88 MS
QT INTERVAL: 304 MS
QT INTERVAL: 342 MS
QTC CALCULATION(BAZETT): 413 MS
QTC CALCULATION(BAZETT): 424 MS
QTC FREDERICIA: 380 MS
QTC FREDERICIA: 388 MS
R AXIS: -1 DEGREES
R AXIS: -2 DEGREES
RBC # BLD AUTO: 4.36 X10*6/UL (ref 4.5–5.9)
RIGHT VENTRICLE FREE WALL PEAK S': 14 CM/S
RIGHT VENTRICLE PEAK SYSTOLIC PRESSURE: 30 MMHG
SODIUM SERPL-SCNC: 137 MMOL/L (ref 136–145)
T AXIS: 19 DEGREES
T AXIS: 48 DEGREES
T OFFSET: 375 MS
T OFFSET: 394 MS
TRICUSPID ANNULAR PLANE SYSTOLIC EXCURSION: 2.5 CM
TRIGL SERPL-MCNC: 143 MG/DL (ref 0–149)
VENTRICULAR RATE: 117 BPM
VENTRICULAR RATE: 88 BPM
VLDL: 29 MG/DL (ref 0–40)
WBC # BLD AUTO: 8.1 X10*3/UL (ref 4.4–11.3)

## 2025-08-12 PROCEDURE — 85027 COMPLETE CBC AUTOMATED: CPT | Performed by: INTERNAL MEDICINE

## 2025-08-12 PROCEDURE — 70450 CT HEAD/BRAIN W/O DYE: CPT

## 2025-08-12 PROCEDURE — 80061 LIPID PANEL: CPT | Performed by: INTERNAL MEDICINE

## 2025-08-12 PROCEDURE — 1200000002 HC GENERAL ROOM WITH TELEMETRY DAILY

## 2025-08-12 PROCEDURE — 85379 FIBRIN DEGRADATION QUANT: CPT | Performed by: NURSE PRACTITIONER

## 2025-08-12 PROCEDURE — 80048 BASIC METABOLIC PNL TOTAL CA: CPT | Performed by: INTERNAL MEDICINE

## 2025-08-12 PROCEDURE — 99233 SBSQ HOSP IP/OBS HIGH 50: CPT | Performed by: NURSE PRACTITIONER

## 2025-08-12 PROCEDURE — 36415 COLL VENOUS BLD VENIPUNCTURE: CPT | Performed by: INTERNAL MEDICINE

## 2025-08-12 PROCEDURE — 36415 COLL VENOUS BLD VENIPUNCTURE: CPT | Performed by: NURSE PRACTITIONER

## 2025-08-12 PROCEDURE — 93306 TTE W/DOPPLER COMPLETE: CPT

## 2025-08-12 PROCEDURE — 2500000001 HC RX 250 WO HCPCS SELF ADMINISTERED DRUGS (ALT 637 FOR MEDICARE OP): Performed by: INTERNAL MEDICINE

## 2025-08-12 PROCEDURE — G0378 HOSPITAL OBSERVATION PER HR: HCPCS

## 2025-08-12 PROCEDURE — 70450 CT HEAD/BRAIN W/O DYE: CPT | Performed by: STUDENT IN AN ORGANIZED HEALTH CARE EDUCATION/TRAINING PROGRAM

## 2025-08-12 RX ADMIN — ATORVASTATIN CALCIUM 40 MG: 40 TABLET, FILM COATED ORAL at 06:13

## 2025-08-12 RX ADMIN — Medication 25 MCG: at 08:31

## 2025-08-12 RX ADMIN — PANTOPRAZOLE SODIUM 40 MG: 40 TABLET, DELAYED RELEASE ORAL at 06:13

## 2025-08-12 RX ADMIN — ACETAMINOPHEN 650 MG: 325 TABLET ORAL at 20:33

## 2025-08-12 RX ADMIN — ASPIRIN 81 MG: 81 TABLET, DELAYED RELEASE ORAL at 08:31

## 2025-08-12 RX ADMIN — ACETAMINOPHEN 650 MG: 325 TABLET ORAL at 08:31

## 2025-08-12 RX ADMIN — METOPROLOL TARTRATE 25 MG: 25 TABLET, FILM COATED ORAL at 08:31

## 2025-08-12 ASSESSMENT — ENCOUNTER SYMPTOMS
MUSCULOSKELETAL NEGATIVE: 1
ENDOCRINE NEGATIVE: 1
COUGH: 0
HEMATOLOGIC/LYMPHATIC NEGATIVE: 1
CHILLS: 0
ABDOMINAL DISTENTION: 0
GASTROINTESTINAL NEGATIVE: 1
ACTIVITY CHANGE: 1
EYES NEGATIVE: 1
DIZZINESS: 1
FEVER: 0
NEUROLOGICAL NEGATIVE: 1
SHORTNESS OF BREATH: 1
PALPITATIONS: 0
WEAKNESS: 0
ABDOMINAL PAIN: 0
ALLERGIC/IMMUNOLOGIC NEGATIVE: 1
FATIGUE: 1
PSYCHIATRIC NEGATIVE: 1

## 2025-08-12 ASSESSMENT — PAIN SCALES - GENERAL
PAINLEVEL_OUTOF10: 0 - NO PAIN
PAINLEVEL_OUTOF10: 3
PAINLEVEL_OUTOF10: 0 - NO PAIN
PAINLEVEL_OUTOF10: 3
PAINLEVEL_OUTOF10: 0 - NO PAIN

## 2025-08-12 ASSESSMENT — COGNITIVE AND FUNCTIONAL STATUS - GENERAL
DAILY ACTIVITIY SCORE: 24
MOBILITY SCORE: 23
CLIMB 3 TO 5 STEPS WITH RAILING: A LITTLE

## 2025-08-12 ASSESSMENT — ACTIVITIES OF DAILY LIVING (ADL): LACK_OF_TRANSPORTATION: NO

## 2025-08-12 ASSESSMENT — PAIN - FUNCTIONAL ASSESSMENT
PAIN_FUNCTIONAL_ASSESSMENT: 0-10

## 2025-08-12 ASSESSMENT — PAIN DESCRIPTION - LOCATION: LOCATION: HEAD

## 2025-08-13 ENCOUNTER — SURGERY (OUTPATIENT)
Age: 65
End: 2025-08-13
Payer: MEDICARE

## 2025-08-13 VITALS
HEIGHT: 69 IN | SYSTOLIC BLOOD PRESSURE: 119 MMHG | BODY MASS INDEX: 22.99 KG/M2 | WEIGHT: 155.2 LBS | OXYGEN SATURATION: 95 % | HEART RATE: 71 BPM | DIASTOLIC BLOOD PRESSURE: 80 MMHG | RESPIRATION RATE: 16 BRPM | TEMPERATURE: 97.7 F

## 2025-08-13 PROBLEM — I20.0 UNSTABLE ANGINA (MULTI): Status: RESOLVED | Noted: 2025-08-11 | Resolved: 2025-08-13

## 2025-08-13 PROCEDURE — 7100000001 HC RECOVERY ROOM TIME - INITIAL BASE CHARGE: Performed by: INTERNAL MEDICINE

## 2025-08-13 PROCEDURE — C1760 CLOSURE DEV, VASC: HCPCS | Performed by: INTERNAL MEDICINE

## 2025-08-13 PROCEDURE — 2500000005 HC RX 250 GENERAL PHARMACY W/O HCPCS: Performed by: INTERNAL MEDICINE

## 2025-08-13 PROCEDURE — 93458 L HRT ARTERY/VENTRICLE ANGIO: CPT | Performed by: INTERNAL MEDICINE

## 2025-08-13 PROCEDURE — 2720000007 HC OR 272 NO HCPCS: Performed by: INTERNAL MEDICINE

## 2025-08-13 PROCEDURE — 99239 HOSP IP/OBS DSCHRG MGMT >30: CPT | Performed by: NURSE PRACTITIONER

## 2025-08-13 PROCEDURE — G0269 OCCLUSIVE DEVICE IN VEIN ART: HCPCS | Performed by: INTERNAL MEDICINE

## 2025-08-13 PROCEDURE — G0378 HOSPITAL OBSERVATION PER HR: HCPCS

## 2025-08-13 PROCEDURE — C1894 INTRO/SHEATH, NON-LASER: HCPCS | Performed by: INTERNAL MEDICINE

## 2025-08-13 PROCEDURE — 2500000004 HC RX 250 GENERAL PHARMACY W/ HCPCS (ALT 636 FOR OP/ED): Performed by: INTERNAL MEDICINE

## 2025-08-13 PROCEDURE — 2780000003 HC OR 278 NO HCPCS: Performed by: INTERNAL MEDICINE

## 2025-08-13 PROCEDURE — 2550000001 HC RX 255 CONTRASTS: Performed by: INTERNAL MEDICINE

## 2025-08-13 PROCEDURE — 7100000002 HC RECOVERY ROOM TIME - EACH INCREMENTAL 1 MINUTE: Performed by: INTERNAL MEDICINE

## 2025-08-13 PROCEDURE — A4217 STERILE WATER/SALINE, 500 ML: HCPCS | Performed by: INTERNAL MEDICINE

## 2025-08-13 PROCEDURE — 2500000001 HC RX 250 WO HCPCS SELF ADMINISTERED DRUGS (ALT 637 FOR MEDICARE OP): Performed by: INTERNAL MEDICINE

## 2025-08-13 PROCEDURE — 99152 MOD SED SAME PHYS/QHP 5/>YRS: CPT | Performed by: INTERNAL MEDICINE

## 2025-08-13 RX ORDER — LIDOCAINE HYDROCHLORIDE 20 MG/ML
INJECTION, SOLUTION INFILTRATION; PERINEURAL AS NEEDED
Status: DISCONTINUED | OUTPATIENT
Start: 2025-08-13 | End: 2025-08-13 | Stop reason: HOSPADM

## 2025-08-13 RX ORDER — SODIUM CHLORIDE 9 MG/ML
INJECTION, SOLUTION INTRAVENOUS CONTINUOUS PRN
Status: COMPLETED | OUTPATIENT
Start: 2025-08-13 | End: 2025-08-13

## 2025-08-13 RX ORDER — ALBUTEROL SULFATE 90 UG/1
2 INHALANT RESPIRATORY (INHALATION) EVERY 4 HOURS PRN
Qty: 8.5 G | Refills: 0 | Status: SHIPPED | OUTPATIENT
Start: 2025-08-13

## 2025-08-13 RX ORDER — MIDAZOLAM HYDROCHLORIDE 1 MG/ML
INJECTION, SOLUTION INTRAMUSCULAR; INTRAVENOUS AS NEEDED
Status: DISCONTINUED | OUTPATIENT
Start: 2025-08-13 | End: 2025-08-13 | Stop reason: HOSPADM

## 2025-08-13 RX ORDER — FENTANYL CITRATE 50 UG/ML
INJECTION, SOLUTION INTRAMUSCULAR; INTRAVENOUS AS NEEDED
Status: DISCONTINUED | OUTPATIENT
Start: 2025-08-13 | End: 2025-08-13 | Stop reason: HOSPADM

## 2025-08-13 RX ADMIN — SODIUM CHLORIDE 50 ML/HR: 9 INJECTION, SOLUTION INTRAVENOUS at 09:10

## 2025-08-13 RX ADMIN — FENTANYL CITRATE 50 MCG: 50 INJECTION, SOLUTION INTRAMUSCULAR; INTRAVENOUS at 09:08

## 2025-08-13 RX ADMIN — MIDAZOLAM 1 MG: 1 INJECTION INTRAMUSCULAR; INTRAVENOUS at 09:08

## 2025-08-13 RX ADMIN — METOPROLOL TARTRATE 25 MG: 25 TABLET, FILM COATED ORAL at 11:43

## 2025-08-13 RX ADMIN — MIDAZOLAM 1 MG: 1 INJECTION INTRAMUSCULAR; INTRAVENOUS at 09:11

## 2025-08-13 RX ADMIN — ACETAMINOPHEN 650 MG: 325 TABLET ORAL at 11:43

## 2025-08-13 RX ADMIN — ASPIRIN 81 MG: 81 TABLET, DELAYED RELEASE ORAL at 08:12

## 2025-08-13 RX ADMIN — Medication 1 DOSE: at 09:10

## 2025-08-13 RX ADMIN — SODIUM CHLORIDE 250 ML: 9 INJECTION, SOLUTION INTRAVENOUS at 09:17

## 2025-08-13 RX ADMIN — IOHEXOL 65 ML: 300 INJECTION, SOLUTION INTRAVENOUS at 09:30

## 2025-08-13 RX ADMIN — PANTOPRAZOLE SODIUM 40 MG: 40 TABLET, DELAYED RELEASE ORAL at 06:38

## 2025-08-13 RX ADMIN — Medication 25 MCG: at 11:43

## 2025-08-13 RX ADMIN — ATORVASTATIN CALCIUM 40 MG: 40 TABLET, FILM COATED ORAL at 06:38

## 2025-08-13 RX ADMIN — LIDOCAINE HYDROCHLORIDE 20 ML: 20 INJECTION, SOLUTION INFILTRATION; PERINEURAL at 09:09

## 2025-08-13 ASSESSMENT — PAIN SCALES - GENERAL
PAINLEVEL_OUTOF10: 2
PAINLEVEL_OUTOF10: 2
PAINLEVEL_OUTOF10: 0 - NO PAIN

## 2025-08-13 ASSESSMENT — COGNITIVE AND FUNCTIONAL STATUS - GENERAL
DAILY ACTIVITIY SCORE: 24
MOBILITY SCORE: 24

## 2025-08-13 ASSESSMENT — PAIN - FUNCTIONAL ASSESSMENT
PAIN_FUNCTIONAL_ASSESSMENT: 0-10

## 2025-08-13 ASSESSMENT — PAIN DESCRIPTION - DESCRIPTORS: DESCRIPTORS: ACHING

## 2025-08-14 ENCOUNTER — TELEPHONE (OUTPATIENT)
Dept: PRIMARY CARE | Facility: CLINIC | Age: 65
End: 2025-08-14
Payer: MEDICARE

## 2025-08-18 ENCOUNTER — APPOINTMENT (OUTPATIENT)
Dept: PRIMARY CARE | Facility: CLINIC | Age: 65
End: 2025-08-18
Payer: MEDICARE

## 2025-08-18 VITALS
TEMPERATURE: 97.8 F | HEART RATE: 60 BPM | WEIGHT: 158 LBS | DIASTOLIC BLOOD PRESSURE: 72 MMHG | BODY MASS INDEX: 23.33 KG/M2 | SYSTOLIC BLOOD PRESSURE: 120 MMHG | OXYGEN SATURATION: 99 %

## 2025-08-18 DIAGNOSIS — K21.9 GASTROESOPHAGEAL REFLUX DISEASE WITHOUT ESOPHAGITIS: ICD-10-CM

## 2025-08-18 DIAGNOSIS — I25.83 CORONARY ATHEROSCLEROSIS DUE TO LIPID RICH PLAQUE: Primary | ICD-10-CM

## 2025-08-18 DIAGNOSIS — C61 PROSTATE CANCER (MULTI): ICD-10-CM

## 2025-08-18 DIAGNOSIS — N40.1 BENIGN PROSTATIC HYPERPLASIA WITH LOWER URINARY TRACT SYMPTOMS, SYMPTOM DETAILS UNSPECIFIED: ICD-10-CM

## 2025-08-18 DIAGNOSIS — I10 BENIGN ESSENTIAL HTN: ICD-10-CM

## 2025-08-18 DIAGNOSIS — J44.9 CHRONIC OBSTRUCTIVE PULMONARY DISEASE, UNSPECIFIED COPD TYPE (MULTI): ICD-10-CM

## 2025-08-18 DIAGNOSIS — R10.13 EPIGASTRIC PAIN: ICD-10-CM

## 2025-08-18 PROCEDURE — 3078F DIAST BP <80 MM HG: CPT | Performed by: INTERNAL MEDICINE

## 2025-08-18 PROCEDURE — 1111F DSCHRG MED/CURRENT MED MERGE: CPT | Performed by: INTERNAL MEDICINE

## 2025-08-18 PROCEDURE — 99496 TRANSJ CARE MGMT HIGH F2F 7D: CPT | Performed by: INTERNAL MEDICINE

## 2025-08-18 PROCEDURE — 3074F SYST BP LT 130 MM HG: CPT | Performed by: INTERNAL MEDICINE

## 2025-08-18 PROCEDURE — 1160F RVW MEDS BY RX/DR IN RCRD: CPT | Performed by: INTERNAL MEDICINE

## 2025-08-18 PROCEDURE — 1159F MED LIST DOCD IN RCRD: CPT | Performed by: INTERNAL MEDICINE

## 2025-08-18 RX ORDER — OMEPRAZOLE 40 MG/1
40 CAPSULE, DELAYED RELEASE ORAL
Qty: 90 CAPSULE | Refills: 1 | Status: SHIPPED | OUTPATIENT
Start: 2025-08-18

## 2025-08-18 ASSESSMENT — ENCOUNTER SYMPTOMS
COUGH: 0
DIARRHEA: 0
FEVER: 0
WHEEZING: 0
ARTHRALGIAS: 0
SHORTNESS OF BREATH: 1
UNEXPECTED WEIGHT CHANGE: 0
SORE THROAT: 0
PALPITATIONS: 0
DIZZINESS: 0
FATIGUE: 0
BLOOD IN STOOL: 0
ABDOMINAL PAIN: 0
BRUISES/BLEEDS EASILY: 0
SINUS PAIN: 0
DIFFICULTY URINATING: 0
HEADACHES: 0

## 2025-08-21 DIAGNOSIS — G45.9 TIA (TRANSIENT ISCHEMIC ATTACK): Primary | ICD-10-CM

## 2025-10-20 ENCOUNTER — APPOINTMENT (OUTPATIENT)
Dept: PULMONOLOGY | Facility: CLINIC | Age: 65
End: 2025-10-20
Payer: MEDICARE

## 2025-10-23 ENCOUNTER — APPOINTMENT (OUTPATIENT)
Dept: PRIMARY CARE | Facility: CLINIC | Age: 65
End: 2025-10-23
Payer: MEDICARE

## (undated) DEVICE — NEEDLE, SPINAL, QUINCKE, 22 G X 7 IN, BLACK HUB

## (undated) DEVICE — ANGIO KIT, LEFT HEART, LF, CUSTOM

## (undated) DEVICE — DRESSING, GAUZE, PAD, 4 X 4 IN, STERILE

## (undated) DEVICE — SYRINGE, LUER LOCK, 12ML

## (undated) DEVICE — COVER, TABLE, 44 X 75 IN, DISPOSABLE, LF, STERILE

## (undated) DEVICE — CLOSURE DEVICE, VASCULAR, MYNX, 5FR

## (undated) DEVICE — CATHETER, ANGIO, IMPULSE, PIG 145, 5 FR X 110 CM (5 PK)

## (undated) DEVICE — Device

## (undated) DEVICE — ACCESS SYSTEM, PINNACLE PRECISION, 5FR X 10CM, ECHOGENIC NEEDLE

## (undated) DEVICE — CUP, MEDICINE, CLEAR, 2 OZ, STERILE

## (undated) DEVICE — DRESSING, NON-ADHERENT, TELFA, OUCHLESS, 3 X 8 IN, STERILE

## (undated) DEVICE — CATHETER, ANGIO, IMPULSE, FL4, 5 FR X 100 CM

## (undated) DEVICE — NEEDLE, BIOPSY, MAX CORE, 18G

## (undated) DEVICE — LABELING SYSTEM, CORRECT MEDICATION, OR, 4 FLAGS, 2SETS OF 24

## (undated) DEVICE — GLOVE, SURGICAL, PROTEXIS PI , 8.0, PF, LF

## (undated) DEVICE — NEEDLE, MERIT ADVANCE, ONE WALL,  21GA X 7.0CM, STANDARD SMOOTH

## (undated) DEVICE — CATHETER, INFINITI DIAGNOSTIC, 5 FR 100CM 3DRC, WILLIAMS RIGHT OR NO TORQUE